# Patient Record
Sex: FEMALE | Race: WHITE | NOT HISPANIC OR LATINO | Employment: FULL TIME | URBAN - METROPOLITAN AREA
[De-identification: names, ages, dates, MRNs, and addresses within clinical notes are randomized per-mention and may not be internally consistent; named-entity substitution may affect disease eponyms.]

---

## 2020-01-30 ENCOUNTER — DOCUMENTATION (OUTPATIENT)
Dept: BARIATRICS/WEIGHT MGMT | Facility: CLINIC | Age: 50
End: 2020-01-30

## 2020-01-30 ENCOUNTER — OFFICE VISIT (OUTPATIENT)
Dept: BARIATRICS/WEIGHT MGMT | Facility: CLINIC | Age: 50
End: 2020-01-30

## 2020-01-30 VITALS
SYSTOLIC BLOOD PRESSURE: 118 MMHG | HEART RATE: 76 BPM | HEIGHT: 66 IN | OXYGEN SATURATION: 99 % | TEMPERATURE: 98.1 F | WEIGHT: 220.5 LBS | BODY MASS INDEX: 35.44 KG/M2 | RESPIRATION RATE: 18 BRPM | DIASTOLIC BLOOD PRESSURE: 66 MMHG

## 2020-01-30 DIAGNOSIS — G47.33 OBSTRUCTIVE SLEEP APNEA SYNDROME: ICD-10-CM

## 2020-01-30 DIAGNOSIS — I10 HYPERTENSION, UNSPECIFIED TYPE: ICD-10-CM

## 2020-01-30 DIAGNOSIS — E66.01 MORBID OBESITY (HCC): ICD-10-CM

## 2020-01-30 DIAGNOSIS — Z87.891 FORMER SMOKER: ICD-10-CM

## 2020-01-30 DIAGNOSIS — E66.9 DIABETES MELLITUS TYPE 2 IN OBESE (HCC): Primary | ICD-10-CM

## 2020-01-30 DIAGNOSIS — K21.9 GASTROESOPHAGEAL REFLUX DISEASE, ESOPHAGITIS PRESENCE NOT SPECIFIED: ICD-10-CM

## 2020-01-30 DIAGNOSIS — R10.13 DYSPEPSIA: ICD-10-CM

## 2020-01-30 DIAGNOSIS — E11.69 DIABETES MELLITUS TYPE 2 IN OBESE (HCC): Primary | ICD-10-CM

## 2020-01-30 PROCEDURE — 99205 OFFICE O/P NEW HI 60 MIN: CPT | Performed by: PHYSICIAN ASSISTANT

## 2020-01-30 RX ORDER — NEBIVOLOL 5 MG/1
5 TABLET ORAL DAILY
COMMUNITY
Start: 2020-01-28

## 2020-01-30 RX ORDER — BUSPIRONE HYDROCHLORIDE 15 MG/1
15 TABLET ORAL 2 TIMES DAILY
COMMUNITY
Start: 2020-01-16 | End: 2022-11-14

## 2020-01-30 RX ORDER — HYDROXYZINE PAMOATE 25 MG/1
25 CAPSULE ORAL 3 TIMES DAILY PRN
COMMUNITY
Start: 2019-12-29 | End: 2022-10-27

## 2020-01-30 RX ORDER — LOSARTAN POTASSIUM 50 MG/1
100 TABLET ORAL DAILY
COMMUNITY
Start: 2020-01-28 | End: 2022-12-30

## 2020-01-30 RX ORDER — CARIPRAZINE 4.5 MG/1
CAPSULE, GELATIN COATED ORAL DAILY
COMMUNITY
Start: 2020-01-16 | End: 2022-10-27

## 2020-01-30 RX ORDER — MELOXICAM 15 MG/1
15 TABLET ORAL DAILY
COMMUNITY
Start: 2020-01-25 | End: 2022-11-14

## 2020-01-30 RX ORDER — LISDEXAMFETAMINE DIMESYLATE 70 MG/1
70 CAPSULE ORAL EVERY MORNING
COMMUNITY
Start: 2019-12-31 | End: 2022-10-27

## 2020-01-30 RX ORDER — LIRAGLUTIDE 6 MG/ML
1.8 INJECTION SUBCUTANEOUS DAILY
COMMUNITY
Start: 2020-01-12 | End: 2022-10-27

## 2020-01-30 RX ORDER — OMEPRAZOLE 40 MG/1
40 CAPSULE, DELAYED RELEASE ORAL DAILY
COMMUNITY
Start: 2019-11-21 | End: 2022-11-14

## 2020-01-30 RX ORDER — HYDROCHLOROTHIAZIDE 25 MG/1
25 TABLET ORAL DAILY
COMMUNITY
Start: 2019-11-20 | End: 2022-12-30

## 2020-01-30 NOTE — PROGRESS NOTES
[facility] BARIATRIC SURGERY  [address]  [address]  [facility] [address]  [phone]      Patient  Name:  Yodit Sotelo  :  1970      Date of Visit: 2020      Chief Complaint:  weight gain; unable to maintain weight loss    History of Present Illness:  Yodit Sotelo is a 49 y.o. female who presents today for evaluation, education and consultation regarding weight loss surgery. The patient is interested in sleeve gastrectomy with Dr. Villa.     Yodit has been overweight for at least 5 years, has been 35 pounds or more overweight for at least 5 years, and started dieting at age 17.  Previous diet attempts include: Low Carbohydrate, Low Fat, Calorie Counting, Olivier's Diet and Slim Fast; Weight Watchers; Wellbutrin, Redux, Fen-Phen and Amphetamines.  The most weight Yodit lost was 60 pounds on WW (6 years ago) but was only able to maintain that weight loss for 2 years.  Her maximum lifetime weight is 220 pounds - current weight.    As above, patient has been overweight for many years, with numerous failed dietary/weight loss attempts.  She now has obesity related comorbidities and as such has decided to pursue weight loss surgery.  All past medical, surgical, social and family history have been obtained and discussed today, as pertinent to bariatric surgery.     Past Medical History:   Diagnosis Date   • ADD (attention deficit disorder)     on Vyvanse   • Anxiety and depression     follows w/ psych NP q6 wks   • CHF (congestive heart failure) (CMS/[facility])     d/t broken heart syndrome remotely, no longer follows w/ cardiology, EF reportedly normal   • DM2 (diabetes mellitus, type 2) (CMS/[facility])     dx 2019, never on insulin, A1C 6.5   • Dyspepsia    • Dyspnea on exertion    • Fatigue    • Former smoker     quit    • GERD (gastroesophageal reflux disease)     controlled w/ daily Prilosec, last EGD  revealing HH   • HTN (hypertension)    • Migraines     on Topamax for  prophylaxis w/ prn Sumatriptan   • Morbid obesity (CMS/HCC)    • Osteoarthritis     daily Mobic, denies steroids   • Sleep apnea     CPAP semi-compliant     Past Surgical History:   Procedure Laterality Date   • ABDOMINOPLASTY     •  SECTION     •  SECTION     •  SECTION     • COLONOSCOPY      benign polyps   • LAPAROSCOPIC HYSTERECTOMY      partial, benign dz   • TONSILLECTOMY         No Known Allergies    Current Outpatient Medications:   •  busPIRone (BUSPAR) 15 MG tablet, Take 15 mg by mouth 2 (Two) Times a Day., Disp: , Rfl:   •  BYSTOLIC 10 MG tablet, Take 10 mg by mouth Daily., Disp: , Rfl:   •  hydroCHLOROthiazide (HYDRODIURIL) 25 MG tablet, Take 25 mg by mouth Daily., Disp: , Rfl:   •  hydrOXYzine pamoate (VISTARIL) 25 MG capsule, Take 25 mg by mouth 3 (Three) Times a Day As Needed., Disp: , Rfl:   •  losartan (COZAAR) 50 MG tablet, Take 50 mg by mouth Daily., Disp: , Rfl:   •  meloxicam (MOBIC) 15 MG tablet, Take 15 mg by mouth Daily., Disp: , Rfl:   •  omeprazole (priLOSEC) 40 MG capsule, Take 40 mg by mouth Daily., Disp: , Rfl:   •  topiramate (TOPAMAX) 200 MG tablet, Take 200 mg by mouth Daily., Disp: , Rfl:   •  VICTOZA 18 MG/3ML solution pen-injector injection, Inject 1.8 mg under the skin into the appropriate area as directed Daily., Disp: , Rfl:   •  VRAYLAR 4.5 MG capsule, Daily., Disp: , Rfl:   •  VYVANSE 70 MG capsule, Take 70 mg by mouth Every Morning  , Disp: , Rfl:     Social History     Socioeconomic History   • Marital status:      Spouse name: Not on file   • Number of children: Not on file   • Years of education: Not on file   • Highest education level: Not on file   Tobacco Use   • Smoking status: Former Smoker     Packs/day: 1.00     Years: 15.00     Pack years: 15.00     Last attempt to quit:      Years since quittin.0   • Smokeless tobacco: Never Used   Substance and Sexual Activity   • Alcohol use: Not Currently   •  Drug use: Never   Social History Narrative    Living in [place], [place] w/ boyfriend.  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       Family History   Problem Relation Age of Onset   • Obesity Mother    • Diabetes Mother    • Hypertension Mother    • Sleep apnea Mother    • Obesity Father    • Skin cancer Father    • Diabetes Father    • Hypertension Father    • Stroke Father    • Obesity Sister    • Hypertension Sister    • Hypertension Brother    • Heart attack Paternal Grandfather        Review of Systems:  Constitutional:  reports fatigue, weight gain and denies fevers, chills.  HEENT:  denies headache, ear pain or loss of hearing, blurred or double vision, nasal discharge or sore throat.  Cardiovascular:  reports HTN, hx heart disease and denies hx MI, chest pain, palpitations, hx DVT.  Respiratory:  reports sleep apnea and denies cough , wheezing, asthma, hx PE.  Gastrointestinal:  reports heartburn and denies dysphagia, nausea, vomiting, abdominal pain, IBS, gallbladder issues, liver disease.  Genitourinary:  denies history of  frequent UTI, incontinence, hematuria, dysuria, polyuria, renal insufficiency.    Musculoskeletal:  reports joint pain, back pain and denies fibromyalgia and autoimmune disease.  Neurological:  reports migraines and denies numbness /tingling, dizziness, confusion, seizure.  Psychiatric:  reports anxiety, depression and denies bipolar disorder.  Endocrine:  reports diabetes and denies thyroid disease.  Hematologic:  denies anemia, bleeding disorder, hx blood transfusion.  Skin:  denies rashes, hx MRSA.    Physical Exam:  Vital Signs:  Weight: 100 kg (220 lb 8 oz)   Body mass index is 35.59 kg/m².  Temp: 98.1 °F (36.7 °C)   Heart Rate: 76   BP: 118/66     Physical Exam   Constitutional: She is oriented to person, place, and time. She appears well-developed and well-nourished.   HENT:   Head: Normocephalic and atraumatic.   Eyes: Conjunctivae are normal. No scleral icterus.   Neck: Neck supple.  No thyromegaly present.   Cardiovascular: Normal rate and regular rhythm.   No murmur heard.  Pulmonary/Chest: Effort normal and breath sounds normal. No respiratory distress. She has no wheezes. She has no rales.   Abdominal: Soft. Bowel sounds are normal. She exhibits no distension and no mass. There is no tenderness. No hernia.   scars: lap hysterectomy, abdominoplasty w/ felicity umbo, pfannenstiel   Musculoskeletal: Normal range of motion. She exhibits no edema.   Neurological: She is alert and oriented to person, place, and time. Gait normal.   Skin: Skin is warm and dry. No rash noted.   Psychiatric: She has a normal mood and affect. Judgment normal.   Vitals reviewed.      Patient Active Problem List   Diagnosis   • Morbid obesity (CMS/HCC)   • Fatigue   • Dyspepsia   • Dyspnea on exertion   • GERD (gastroesophageal reflux disease)   • DM2 (diabetes mellitus, type 2) (CMS/HCC)   • Anxiety and depression   • HTN (hypertension)   • CHF (congestive heart failure) (CMS/[facility])   • Osteoarthritis   • Migraines   • Former smoker   • Sleep apnea   • ADD (attention deficit disorder)       Assessment:    Yodit Sotelo is a 49 y.o. female with medically complicated obesity pursuing sleeve gastrectomy.    Weight loss surgery is deemed medically necessary given the following obesity related comorbidities including sleep apnea, diabetes, hypertension and GERD with current Weight: 100 kg (220 lb 8 oz) and Body mass index is 35.59 kg/m².    Plan:  Patient understands that bariatric surgery is not cosmetic surgery but rather a tool to help make a lifelong commitment to lifestyle changes including diet, exercise, behavior modifications, and healthy habits.  The patient has been educated today on those expected postoperative lifestyle changes.  The surgical procedure was discussed and all questions were answered.  Instructions on how to access [employer] (an internet based site w/ educational surgical videos) were given to the patient.   Recommended vitamin supplementation was reviewed.  The importance of avoiding ASA/ NSAIDS/ steroids/ tobacco/ hormones/ immunomodulators perioperatively was discussed in detail.  Psychological and Nutritional evaluations will be arranged prior to surgery.  The patient was advised to start on a high protein and low carbohydrate diet in preparation for surgery.      Preop testing will include: CBC, CMP, Lipids, TSH, HgA1C, H.Pylori stool, Pulmonary Function Testing, CXR and EKG, and EGD.    The risks and benefits of the upper endoscopy were discussed with the patient in detail and all questions were answered.  Possibility of perforation, bleeding, aspiration, and anesthesia reaction were reviewed.  Patient agrees to proceed.    Additional preop clearances required prior to surgery: Cardiac.      Patient is an acceptable candidate for surgery pending the above results and final consultation w/ Dr. Villa.    MARGE Mcguire        MDM: New problem w/ further workup planned.  Labs, imaging, additional testing planned, old records (op notes) obtained /reviewed, all to be discussed further w/ surgeon.  HIGH complexity.

## 2020-01-30 NOTE — PROGRESS NOTES
"Weight Loss Surgery  Presurgical Nutrition Assessment     Yodit Sotelo  2020  [id number]  [id number]  1970  female    Surgery desired: Sleeve Gastrectomy    Ht 167.6 cm (66\"); Wt 100 kg (220.5 #); BMI 35.6  Past Medical History:   Diagnosis Date   • ADD (attention deficit disorder)     on Vyvanse   • Anxiety and depression     follows w/ psych NP q6 wks   • CHF (congestive heart failure) (CMS/[facility])     d/t broken heart syndrome remotely, no longer follows w/ cardiology, EF reportedly normal   • DM2 (diabetes mellitus, type 2) (CMS/[facility])     dx , never on insulin, A1C 6.5   • Dyspepsia    • Dyspnea on exertion    • Fatigue    • Former smoker     quit    • GERD (gastroesophageal reflux disease)     controlled w/ daily Prilosec, last EGD  revealing HH   • HTN (hypertension)    • Migraines     on Topamax for prophylaxis w/ prn Sumatriptan   • Morbid obesity (CMS/[facility])    • Osteoarthritis     daily Mobic, denies steroids   • Sleep apnea     CPAP semi-compliant     Past Surgical History:   Procedure Laterality Date   • ABDOMINOPLASTY     •  SECTION     •  SECTION     •  SECTION     • COLONOSCOPY      benign polyps   • LAPAROSCOPIC HYSTERECTOMY      partial, benign dz   • TONSILLECTOMY       No Known Allergies    Current Outpatient Medications:   •  busPIRone (BUSPAR) 15 MG tablet, Take 15 mg by mouth 2 (Two) Times a Day., Disp: , Rfl:   •  BYSTOLIC 10 MG tablet, Take 10 mg by mouth Daily., Disp: , Rfl:   •  hydroCHLOROthiazide (HYDRODIURIL) 25 MG tablet, Take 25 mg by mouth Daily., Disp: , Rfl:   •  hydrOXYzine pamoate (VISTARIL) 25 MG capsule, Take 25 mg by mouth 3 (Three) Times a Day As Needed., Disp: , Rfl:   •  losartan (COZAAR) 50 MG tablet, Take 50 mg by mouth Daily., Disp: , Rfl:   •  meloxicam (MOBIC) 15 MG tablet, Take 15 mg by mouth Daily., Disp: , Rfl:   •  omeprazole (priLOSEC) 40 MG capsule, Take 40 mg by mouth Daily., Disp: , Rfl:   •  " topiramate (TOPAMAX) 200 MG tablet, Take 200 mg by mouth Daily., Disp: , Rfl:   •  VICTOZA 18 MG/3ML solution pen-injector injection, Inject 1.8 mg under the skin into the appropriate area as directed Daily., Disp: , Rfl:   •  VRAYLAR 4.5 MG capsule, Daily., Disp: , Rfl:   •  VYVANSE 70 MG capsule, Take 70 mg by mouth Every Morning  , Disp: , Rfl:       Nutrition Assessment    Estimated energy needs:  1645 kcal    Estimated calories for weight loss:  1200 kcal    IBW (Pounds):  155 #        Excess body weight (Pounds):  65 #       Nutrition Recall  24 Hour recall: (B) (L) (D) -  Reviewed and discussed with patient.  Pt is Dxd /c DMT2 & has been following a lower CHO diet, similar to WW, having participated in this in the past.  She has never had diabetic diet ed. Last a1c stated to be 6.5.    Brkfast = 1 egg, 1 oz cheese & 2 sl nevarez /c a biscuit.  Lunch = chicken melody (~3 oz) /c 4 potato wedges.  Dinner = Chicken stefanie /c 1 serving pasta & Occitan bread.  Diet low in protein - ingesting only ~ 1/2 as much as recommended for wt mgt.  Pt to focus on ingesting adeq protein in 3 meals + 2-3 high prot snks qd /c fewer breads & potatoes.     Exercise  never      Education    Provided information packet re:  Sleeve Gastrectomy  1. Reviewed guidelines for higher protein, limited carbohydrate diet to promote weight loss.  Encouraged patient to incorporate these principles of healthy eating from now until approximately 2 weeks prior to bariatric surgery date, when an even lower carbohydrate “liver-shrinking” regimen will be followed. (Information sheet re pre-op diet given).  Explained that after recovery from surgery this diet will again be followed to ensure further loss and for weight maintenance.    2. Encouraged patient to choose an acceptable protein supplement powder or shake for post-surgery liquid diet.  Provided product guidelines and examples.    3. Explained importance of goal setting to help in changing eating  behaviors that are not conducive to weight loss.  Targeted several on a worksheet which also included spaces for patient to work on issues specific to them.  4. Provided follow-up options for support, including contact information for dietitians here, if desired.  Web-based support information and apps for smart phones and computers given.  Noted that monthly support group is offered at this clinic, and that support is associated with successful weight loss.    Recommend that team proceed with surgery and follow per protocol.      Nutrition Goals   Dietary Guidelines per information packet as described above  Protein goal:  grams per day   Carbohydrate goal:  100-140 grams per day  Eliminate soda, sweet tea, etc.     Exercise Goals  Continue current exercise routine   Add 15-30 minutes of activity per day as tolerated      Christelle Abernathy RD  01/30/2020  3:36 PM

## 2020-02-24 ENCOUNTER — OFFICE VISIT (OUTPATIENT)
Dept: CARDIOLOGY | Facility: CLINIC | Age: 50
End: 2020-02-24

## 2020-02-24 VITALS
OXYGEN SATURATION: 98 % | DIASTOLIC BLOOD PRESSURE: 88 MMHG | BODY MASS INDEX: 35.62 KG/M2 | HEART RATE: 77 BPM | HEIGHT: 66 IN | SYSTOLIC BLOOD PRESSURE: 121 MMHG | WEIGHT: 221.6 LBS

## 2020-02-24 DIAGNOSIS — I10 ESSENTIAL HYPERTENSION: ICD-10-CM

## 2020-02-24 DIAGNOSIS — Z76.89 ENCOUNTER TO ESTABLISH CARE: ICD-10-CM

## 2020-02-24 DIAGNOSIS — R06.02 SHORTNESS OF BREATH: ICD-10-CM

## 2020-02-24 DIAGNOSIS — Z01.810 PRE-OPERATIVE CARDIOVASCULAR EXAMINATION: ICD-10-CM

## 2020-02-24 PROCEDURE — 99213 OFFICE O/P EST LOW 20 MIN: CPT | Performed by: PHYSICIAN ASSISTANT

## 2020-02-24 PROCEDURE — 93000 ELECTROCARDIOGRAM COMPLETE: CPT | Performed by: PHYSICIAN ASSISTANT

## 2020-02-24 NOTE — PATIENT INSTRUCTIONS

## 2020-02-24 NOTE — PROGRESS NOTES
Subjective   Yodit Sotelo is a 49 y.o. female     Chief Complaint   Patient presents with   • Establish Care     Pt here to establish cardiac care and cardiac clearance         HPI   The patient is a 49-year-old white female who presents today for establishment of cardiac care, but also for cardiac clearance prior to bariatric procedure.  The patient reports cardiac history of Takotsubo cardiomyopathy, occurring in roughly 2013 with an obvious precipitating event by patient report.  She had follow-up echocardiogram 2 months later and reported a normal systolic function.  Work-up for the cardiomyopathy suggested normal coronaries by her report.  She was lost to follow-up through cardiology and presents today to discuss consideration for clearance for surgical procedures.  Symptomatically, the patient continues to do fairly well from cardiovascular standpoint.  She has stable dyspnea and fatigue.  She has stable lower extremity edema.  She has no chest pain.  She denies neck, arm, or jaw discomfort.  The patient has had no evidence of PND, orthopnea, or other symptoms to potentially describe decompensation.  The patient has only rare palpitations, reporting no sustained dysrhythmic activity.  She has no dizziness or syncope at that time.  She has no further complaints otherwise and presents today for evaluation prior to surgery from cardiovascular standpoint as above.      Current Outpatient Medications   Medication Sig Dispense Refill   • busPIRone (BUSPAR) 15 MG tablet Take 15 mg by mouth 2 (Two) Times a Day.     • BYSTOLIC 10 MG tablet Take 10 mg by mouth Daily.     • hydroCHLOROthiazide (HYDRODIURIL) 25 MG tablet Take 25 mg by mouth Daily.     • hydrOXYzine pamoate (VISTARIL) 25 MG capsule Take 25 mg by mouth 3 (Three) Times a Day As Needed.     • losartan (COZAAR) 50 MG tablet Take 50 mg by mouth Daily.     • meloxicam (MOBIC) 15 MG tablet Take 15 mg by mouth Daily.     • Multiple Vitamin (MULTIVITAMINS PO) Take  1 tablet by mouth Daily.     • omeprazole (priLOSEC) 40 MG capsule Take 40 mg by mouth Daily.     • topiramate (TOPAMAX) 200 MG tablet Take 200 mg by mouth Daily.     • VICTOZA 18 MG/3ML solution pen-injector injection Inject 1.8 mg under the skin into the appropriate area as directed Daily.     • Vit B6-Vit [id number] 75-.012-1.2-500 MG patch Place 1 patch on the skin as directed by provider Daily.     • VRAYLAR 4.5 MG capsule Daily.     • VYVANSE 70 MG capsule Take 70 mg by mouth Every Morning         No current facility-administered medications for this visit.        Patient has no known allergies.    Past Medical History:   Diagnosis Date   • ADD (attention deficit disorder)     on Vyvanse   • Anxiety and depression     follows w/ psych NP q6 wks   • CHF (congestive heart failure) (CMS/[facility])     d/t broken heart syndrome remotely, no longer follows w/ cardiology, EF reportedly normal   • DM2 (diabetes mellitus, type 2) (CMS/[facility])     dx , never on insulin, A1C 6.5   • Dyspepsia    • Dyspnea on exertion    • Fatigue    • Former smoker     quit    • GERD (gastroesophageal reflux disease)     controlled w/ daily Prilosec, last EGD  revealing HH   • HTN (hypertension)    • Migraines     on Topamax for prophylaxis w/ prn Sumatriptan   • Morbid obesity (CMS/[facility])    • Osteoarthritis     daily Mobic, denies steroids   • Sleep apnea     CPAP semi-compliant       Social History     Socioeconomic History   • Marital status:      Spouse name: Not on file   • Number of children: Not on file   • Years of education: Not on file   • Highest education level: Not on file   Tobacco Use   • Smoking status: Former Smoker     Packs/day: 1.00     Years: 15.00     Pack years: 15.00     Last attempt to quit:      Years since quittin.1   • Smokeless tobacco: Never Used   Substance and Sexual Activity   • Alcohol use: Not Currently   • Drug use: Never   Social History Narrative    Living in [place], [place] w/  "zoraidareyna.  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         Family History   Problem Relation Age of Onset   • Obesity Mother    • Diabetes Mother    • Hypertension Mother    • Sleep apnea Mother    • Obesity Father    • Skin cancer Father    • Diabetes Father    • Hypertension Father    • Stroke Father    • Obesity Sister    • Hypertension Sister    • Hypertension Brother    • Heart attack Paternal Grandfather        Review of Systems   Constitutional: Negative.  Negative for fatigue.   HENT: Negative.  Negative for congestion, rhinorrhea and sore throat.    Eyes: Positive for visual disturbance (wears glasses daily).   Respiratory: Negative.  Negative for chest tightness, shortness of breath and wheezing.    Cardiovascular: Negative.  Negative for chest pain, palpitations and leg swelling.   Gastrointestinal: Negative.  Negative for abdominal pain, nausea and vomiting.   Endocrine: Negative.  Negative for cold intolerance and heat intolerance.   Genitourinary: Negative.  Negative for difficulty urinating, frequency and urgency.   Musculoskeletal: Negative for arthralgias, back pain and neck pain.   Skin: Negative.  Negative for rash and wound.   Allergic/Immunologic: Negative.  Negative for environmental allergies and food allergies.   Neurological: Positive for dizziness (dizziness at night when lays down). Negative for syncope and light-headedness.   Hematological: Negative.  Does not bruise/bleed easily.   Psychiatric/Behavioral: Negative for agitation, confusion and sleep disturbance (denies waking up smothering/SOA, wears CPAP). The patient is not nervous/anxious.        Objective     Vitals:    02/24/20 1509   BP: 121/88   BP Location: Left arm   Patient Position: Sitting   Pulse: 77   SpO2: 98%   Weight: 101 kg (221 lb 9.6 oz)   Height: 167.6 cm (66\")        /88 (BP Location: Left arm, Patient Position: Sitting)   Pulse 77   Ht 167.6 cm (66\")   Wt 101 kg (221 lb 9.6 oz)   SpO2 98%   BMI 35.77 kg/m²  "     Lab Results (most recent)     None          Physical Exam   Constitutional: She is oriented to person, place, and time. She appears well-developed and well-nourished. No distress.   HENT:   Head: Normocephalic and atraumatic.   Eyes: Pupils are equal, round, and reactive to light. Conjunctivae and EOM are normal.   Neck: Normal range of motion. Neck supple. No JVD present. No tracheal deviation present.   Cardiovascular: Normal rate, regular rhythm, normal heart sounds and intact distal pulses.   Pulmonary/Chest: Effort normal and breath sounds normal.   Abdominal: Soft. Bowel sounds are normal. She exhibits no distension and no mass. There is no tenderness. There is no rebound and no guarding.   Musculoskeletal: Normal range of motion. She exhibits no edema, tenderness or deformity.   Neurological: She is alert and oriented to person, place, and time.   Skin: Skin is warm and dry. No rash noted. No erythema. No pallor.   Psychiatric: She has a normal mood and affect. Her behavior is normal. Judgment and thought content normal.   Nursing note and vitals reviewed.      Procedure     ECG 12 Lead  Date/Time: 2/24/2020 3:24 PM  Performed by: Keyon Camargo PA  Authorized by: Keyon Camargo PA   Previous ECG: no previous ECG available  Comments: Sinus rhythm at 68, normal axis, no acute changes noted.                 Assessment/Plan      Diagnosis Plan   1. Shortness of breath  Adult Transthoracic Echo Complete W/ Cont if Necessary Per Protocol   2. Essential hypertension  Adult Transthoracic Echo Complete W/ Cont if Necessary Per Protocol   3. Pre-operative cardiovascular examination  Adult Transthoracic Echo Complete W/ Cont if Necessary Per Protocol   4. Encounter to establish care  ECG 12 Lead    Adult Transthoracic Echo Complete W/ Cont if Necessary Per Protocol       1.  I would like to schedule for an echocardiogram given history of Takotsubo cardiomyopathy.  We need to evaluate LV size, function,  valvular morphologies, and parameters otherwise.    2.  The patient reports history of basically normal coronaries and work-up of cardiomyopathy as above.  A follow-up echo just 2 months after presentation with the same apparently suggested normalized systolic function.  We will attempt to obtain all those records.    3.  At this time, I feel the patient is on appropriate medications and will continue that without change.  Blood pressures typically are normotensive on that therapy.  She will continue those, will monitor blood pressures closely at home, and will call to us for any issues.    4.  For any complications of future, the patient will call to us immediately.  We will see her in follow-up of echo and recommend her further on surgical status at that time.  I do not feel she needs ischemia assessment at this time as she has no symptoms of angina and has had a history of largely normal coronaries by previous work-up.  If clinical course should change, she will call to us immediately.          Patient's Body mass index is 35.77 kg/m². BMI is above normal parameters. Recommendations include: educational material and referral to primary care.           Electronically signed by:    Answers for HPI/ROS submitted by the patient on 2/17/2020   What is the primary reason for your visit?: Physical

## 2020-02-27 ENCOUNTER — LAB REQUISITION (OUTPATIENT)
Dept: LAB | Facility: HOSPITAL | Age: 50
End: 2020-02-27

## 2020-02-27 ENCOUNTER — OUTSIDE FACILITY SERVICE (OUTPATIENT)
Dept: BARIATRICS/WEIGHT MGMT | Facility: CLINIC | Age: 50
End: 2020-02-27

## 2020-02-27 DIAGNOSIS — K21.00 GASTRO-ESOPHAGEAL REFLUX DISEASE WITH ESOPHAGITIS: ICD-10-CM

## 2020-02-27 PROCEDURE — 43239 EGD BIOPSY SINGLE/MULTIPLE: CPT | Performed by: SURGERY

## 2020-02-27 PROCEDURE — 88305 TISSUE EXAM BY PATHOLOGIST: CPT | Performed by: SURGERY

## 2020-02-28 LAB
CYTO UR: NORMAL
LAB AP CASE REPORT: NORMAL
LAB AP CLINICAL INFORMATION: NORMAL
PATH REPORT.FINAL DX SPEC: NORMAL
PATH REPORT.GROSS SPEC: NORMAL

## 2020-03-19 ENCOUNTER — HOSPITAL ENCOUNTER (OUTPATIENT)
Dept: CARDIOLOGY | Facility: HOSPITAL | Age: 50
Discharge: HOME | End: 2020-03-19
Admitting: PHYSICIAN ASSISTANT

## 2020-03-19 VITALS — WEIGHT: 222.66 LBS | HEIGHT: 66 IN | BODY MASS INDEX: 35.79 KG/M2

## 2020-03-19 DIAGNOSIS — R06.02 SHORTNESS OF BREATH: ICD-10-CM

## 2020-03-19 DIAGNOSIS — Z76.89 ENCOUNTER TO ESTABLISH CARE: ICD-10-CM

## 2020-03-19 DIAGNOSIS — I10 ESSENTIAL HYPERTENSION: ICD-10-CM

## 2020-03-19 DIAGNOSIS — Z01.810 PRE-OPERATIVE CARDIOVASCULAR EXAMINATION: ICD-10-CM

## 2020-03-19 PROCEDURE — 93306 TTE W/DOPPLER COMPLETE: CPT

## 2020-03-19 PROCEDURE — 93306 TTE W/DOPPLER COMPLETE: CPT | Performed by: INTERNAL MEDICINE

## 2020-03-23 ENCOUNTER — APPOINTMENT (OUTPATIENT)
Dept: CARDIOLOGY | Facility: HOSPITAL | Age: 50
End: 2020-03-23

## 2020-03-25 LAB — H PYLORI AG STL QL IA: NEGATIVE

## 2020-03-29 LAB
BH CV ECHO MEAS - AO MAX PG (FULL): 0.67 MMHG
BH CV ECHO MEAS - AO MAX PG: 3 MMHG
BH CV ECHO MEAS - AO MEAN PG (FULL): 0 MMHG
BH CV ECHO MEAS - AO MEAN PG: 1 MMHG
BH CV ECHO MEAS - AO ROOT AREA (BSA CORRECTED): 1.3
BH CV ECHO MEAS - AO ROOT AREA: 6.2 CM^2
BH CV ECHO MEAS - AO ROOT DIAM: 2.8 CM
BH CV ECHO MEAS - AO V2 MAX: 86.1 CM/SEC
BH CV ECHO MEAS - AO V2 MEAN: 55.8 CM/SEC
BH CV ECHO MEAS - AO V2 VTI: 21.2 CM
BH CV ECHO MEAS - BSA(HAYCOCK): 2.2 M^2
BH CV ECHO MEAS - BSA: 2.1 M^2
BH CV ECHO MEAS - BZI_BMI: 35.7 KILOGRAMS/M^2
BH CV ECHO MEAS - BZI_METRIC_HEIGHT: 167.6 CM
BH CV ECHO MEAS - BZI_METRIC_WEIGHT: 100.2 KG
BH CV ECHO MEAS - EDV(CUBED): 44.7 ML
BH CV ECHO MEAS - EDV(TEICH): 52.6 ML
BH CV ECHO MEAS - EF(CUBED): 69.9 %
BH CV ECHO MEAS - EF(TEICH): 62.5 %
BH CV ECHO MEAS - ESV(CUBED): 13.5 ML
BH CV ECHO MEAS - ESV(TEICH): 19.7 ML
BH CV ECHO MEAS - FS: 33 %
BH CV ECHO MEAS - IVS/LVPW: 1.3
BH CV ECHO MEAS - IVSD: 1.2 CM
BH CV ECHO MEAS - LA DIMENSION: 4 CM
BH CV ECHO MEAS - LA/AO: 1.4
BH CV ECHO MEAS - LAT PEAK E' VEL: 8.8 CM/SEC
BH CV ECHO MEAS - LV IVRT: 0.13 SEC
BH CV ECHO MEAS - LV MASS(C)D: 116.5 GRAMS
BH CV ECHO MEAS - LV MASS(C)DI: 55.8 GRAMS/M^2
BH CV ECHO MEAS - LV MAX PG: 2.3 MMHG
BH CV ECHO MEAS - LV MEAN PG: 1 MMHG
BH CV ECHO MEAS - LV V1 MAX: 75.7 CM/SEC
BH CV ECHO MEAS - LV V1 MEAN: 47.9 CM/SEC
BH CV ECHO MEAS - LV V1 VTI: 19.1 CM
BH CV ECHO MEAS - LVIDD: 3.6 CM
BH CV ECHO MEAS - LVIDS: 2.4 CM
BH CV ECHO MEAS - LVPWD: 0.94 CM
BH CV ECHO MEAS - MED PEAK E' VEL: 9 CM/SEC
BH CV ECHO MEAS - MV A MAX VEL: 34.5 CM/SEC
BH CV ECHO MEAS - MV DEC SLOPE: 398 CM/SEC^2
BH CV ECHO MEAS - MV DEC TIME: 0.14 SEC
BH CV ECHO MEAS - MV E MAX VEL: 64.3 CM/SEC
BH CV ECHO MEAS - MV E/A: 1.9
BH CV ECHO MEAS - MV MAX PG: 2.5 MMHG
BH CV ECHO MEAS - MV MEAN PG: 1 MMHG
BH CV ECHO MEAS - MV P1/2T MAX VEL: 82.1 CM/SEC
BH CV ECHO MEAS - MV P1/2T: 60.4 MSEC
BH CV ECHO MEAS - MV V2 MAX: 78.9 CM/SEC
BH CV ECHO MEAS - MV V2 MEAN: 53 CM/SEC
BH CV ECHO MEAS - MV V2 VTI: 18.4 CM
BH CV ECHO MEAS - MVA P1/2T LCG: 2.7 CM^2
BH CV ECHO MEAS - MVA(P1/2T): 3.6 CM^2
BH CV ECHO MEAS - PA MAX PG (FULL): 1 MMHG
BH CV ECHO MEAS - PA MAX PG: 3 MMHG
BH CV ECHO MEAS - PA MEAN PG (FULL): 1 MMHG
BH CV ECHO MEAS - PA MEAN PG: 2 MMHG
BH CV ECHO MEAS - PA V2 MAX: 86.1 CM/SEC
BH CV ECHO MEAS - PA V2 MEAN: 59.5 CM/SEC
BH CV ECHO MEAS - PA V2 VTI: 19.9 CM
BH CV ECHO MEAS - RAP SYSTOLE: 10 MMHG
BH CV ECHO MEAS - RV MAX PG: 1.9 MMHG
BH CV ECHO MEAS - RV MEAN PG: 1 MMHG
BH CV ECHO MEAS - RV V1 MAX: 69.2 CM/SEC
BH CV ECHO MEAS - RV V1 MEAN: 40.7 CM/SEC
BH CV ECHO MEAS - RV V1 VTI: 12.3 CM
BH CV ECHO MEAS - RVDD: 3.1 CM
BH CV ECHO MEAS - RVSP: 19 MMHG
BH CV ECHO MEAS - SI(AO): 62.6 ML/M^2
BH CV ECHO MEAS - SI(CUBED): 15 ML/M^2
BH CV ECHO MEAS - SI(TEICH): 15.8 ML/M^2
BH CV ECHO MEAS - SV(AO): 130.5 ML
BH CV ECHO MEAS - SV(CUBED): 31.3 ML
BH CV ECHO MEAS - SV(TEICH): 32.9 ML
BH CV ECHO MEAS - TR MAX VEL: 146 CM/SEC
BH CV ECHO MEASUREMENTS AVERAGE E/E' RATIO: 7.22
MAXIMAL PREDICTED HEART RATE: 172 BPM
STRESS TARGET HR: 146 BPM

## 2022-09-26 ENCOUNTER — TELEPHONE (OUTPATIENT)
Dept: SURGERY | Facility: CLINIC | Age: 52
End: 2022-09-26

## 2022-09-26 NOTE — TELEPHONE ENCOUNTER
I ATTEMPTED TO REACH PATIENT TO SCHEDULE APPT, REFERRED BY DR. ADAMSON FOR REFLUX. I LEFT MESSAGE TO RETURN MY CALL.

## 2022-10-27 ENCOUNTER — OFFICE VISIT (OUTPATIENT)
Dept: SURGERY | Facility: CLINIC | Age: 52
End: 2022-10-27

## 2022-10-27 VITALS
WEIGHT: 227.8 LBS | HEIGHT: 66 IN | BODY MASS INDEX: 36.61 KG/M2 | SYSTOLIC BLOOD PRESSURE: 119 MMHG | DIASTOLIC BLOOD PRESSURE: 76 MMHG | HEART RATE: 80 BPM

## 2022-10-27 DIAGNOSIS — R10.13 DYSPEPSIA: Primary | ICD-10-CM

## 2022-10-27 PROCEDURE — 99214 OFFICE O/P EST MOD 30 MIN: CPT | Performed by: SURGERY

## 2022-10-27 RX ORDER — TIRZEPATIDE 2.5 MG/.5ML
INJECTION, SOLUTION SUBCUTANEOUS
COMMUNITY
Start: 2022-10-20 | End: 2022-12-30

## 2022-10-27 RX ORDER — ONDANSETRON 4 MG/1
TABLET, ORALLY DISINTEGRATING ORAL
COMMUNITY
Start: 2022-09-12 | End: 2022-12-30

## 2022-10-27 RX ORDER — SODIUM CHLORIDE 0.9 % (FLUSH) 0.9 %
10 SYRINGE (ML) INJECTION AS NEEDED
Status: CANCELLED | OUTPATIENT
Start: 2022-12-06

## 2022-10-27 RX ORDER — SODIUM CHLORIDE 0.9 % (FLUSH) 0.9 %
10 SYRINGE (ML) INJECTION EVERY 12 HOURS SCHEDULED
Status: CANCELLED | OUTPATIENT
Start: 2022-12-06

## 2022-10-27 RX ORDER — ROSUVASTATIN CALCIUM 5 MG/1
5 TABLET, COATED ORAL DAILY
COMMUNITY
Start: 2022-09-04 | End: 2022-11-14

## 2022-10-27 NOTE — PROGRESS NOTES
Date of Service: 10/27/2022    Subjective   Yodit Sotelo is a 52 y.o. female is being seen for consultation for reflux today at the request of Saad Alvarado MD    Chief complaint: Dry cough, throat clearing, voice changes    Yodit Sotelo is a 52 y.o. obese female who presents to my clinic with progressively worsening dry cough, voice changes, sore throat.  She has had what she thought was sinus drainage issues for several years.  She has seen ENT and trialed multiple antihistamines.  Reports globus sensation and throat.  She has been on omeprazole for several years for acid reflux.  She denies substernal chest burning or dysphagia.  She denies unintentional weight loss.        Past Medical History:   Diagnosis Date   • ADD (attention deficit disorder)     on Vyvanse   • Anxiety and depression     follows w/ psych NP q6 wks   • CHF (congestive heart failure) ([facility])     d/t broken heart syndrome remotely, no longer follows w/ cardiology, EF reportedly normal   • DM2 (diabetes mellitus, type 2) ([facility])     dx , never on insulin, A1C 6.5   • Dyspepsia    • Dyspnea on exertion    • Fatigue    • Former smoker     quit    • GERD (gastroesophageal reflux disease)     controlled w/ daily Prilosec, last EGD  revealing HH   • HTN (hypertension)    • Migraines     on Topamax for prophylaxis w/ prn Sumatriptan   • Morbid obesity ([facility])    • Osteoarthritis     daily Mobic, denies steroids   • Sleep apnea     CPAP semi-compliant       Past Surgical History:   Procedure Laterality Date   • ABDOMINOPLASTY     •  SECTION     •  SECTION     •  SECTION     • COLONOSCOPY      benign polyps   • LAPAROSCOPIC HYSTERECTOMY      partial, benign dz   • TONSILLECTOMY           Family History   Problem Relation Age of Onset   • Obesity Mother    • Diabetes Mother    • Hypertension Mother    • Sleep apnea Mother    • Obesity Father    • Skin cancer Father    • Diabetes Father    •  "Hypertension Father    • Stroke Father    • Obesity Sister    • Hypertension Sister    • Hypertension Brother    • Heart attack Paternal Grandfather          Social History     Socioeconomic History   • Marital status:    Tobacco Use   • Smoking status: Former     Packs/day: 1.00     Years: 15.00     Pack years: 15.00     Types: Cigarettes     Quit date:      Years since quittin.8   • Smokeless tobacco: Never   Vaping Use   • Vaping Use: Former   Substance and Sexual Activity   • Alcohol use: Not Currently   • Drug use: Never   • Sexual activity: Defer                Review of Systems      Constitutional: No fevers, chills or malaise. No unintentional weight loss   Eyes: Denies visual changes    Cardiovascular: Denies chest pain, palpitations   Respiratory: Denies cough or shortness of breath   Abdominal/Gastrointestinal: See HPI    Genitourinary: Denies dysuria or hematuria   Musculoskeletal: Denies any chronic joint aches, pains or deformities              Skin: No lesions or rashes   Psychiatric: No recent mood changes   Neurologic: No paresthesias or loss of function        Objective     Physical Exam:      10/27/22  1514   Weight: 103 kg (227 lb 12.8 oz)    Body mass index is 36.77 kg/m².  Constitution: /76   Pulse 80   Ht 167.6 cm (66\")   Wt 103 kg (227 lb 12.8 oz)   BMI 36.77 kg/m²  . No acute distress.  Obese  Head: Normocephalic, atraumatic.   Eyes: Aligned without strabismus. Conjunctivae noninjected   Ears, Nose, Mouth: , no lesions appreciated   CV: Rhythm  and rate regular   Respiratory: Symmetric chest expansion. No respiratory distress.   Gastrointestinal:  Soft, nontender, nondistended.  Prior incisions noted, no evidence of hernia.  Skin:  No cyanosis, clubbing or edema bilaterally    Lymphatics: No abnormal cervical or supraclavicular adenopathy  Neurologic: No gross deficits.  Alert and oriented x3  Psychiatric: Normal mood            Assessment     Yodit Sotelo is a 52 " y.o. obese female with possible medically refractory gastroesophageal reflux disease    We discussed the work-up to include an upper GI study as well as an EGD with Bravo pH probe.               Carlos Lemon MD  [facility]

## 2022-11-03 ENCOUNTER — APPOINTMENT (OUTPATIENT)
Dept: GENERAL RADIOLOGY | Facility: HOSPITAL | Age: 52
End: 2022-11-03

## 2022-11-07 ENCOUNTER — TELEPHONE (OUTPATIENT)
Dept: SURGERY | Facility: CLINIC | Age: 52
End: 2022-11-07

## 2022-11-07 NOTE — TELEPHONE ENCOUNTER
ATTEMPTED TO CONTACT PATIENT BY PHONE WITH THE FOLLOWING SCHEDULE, LEFT VOICE MAIL AND NOW FOLLOWING WITH A APPOINTMENT LETTER WITH PROCEDURE DETAILS AS FOLLOW:    UGI: TUES NOV 8, 2022 @ 9 AM ARRIVAL FOR 9:15 UGI AT [facility] ON [address]. NOTHING TO EAT OR DRINK 6 HOURS PRIOR TO THIS SCAN.    EGD / BRAVO STUDY:  TUES NOV 15, 2022 ARRIVE AT [facility] AT 11 AM.  NOTHING TO EAT OR DRINK AFTER MIDNIGHT. PLEASE HAVE AN ADULT  TO DRIVE YOU HOME AFTER THIS PROCEDURE.

## 2022-11-08 ENCOUNTER — HOSPITAL ENCOUNTER (OUTPATIENT)
Dept: GENERAL RADIOLOGY | Facility: HOSPITAL | Age: 52
Discharge: HOME | End: 2022-11-08
Admitting: SURGERY

## 2022-11-08 DIAGNOSIS — R10.13 DYSPEPSIA: ICD-10-CM

## 2022-11-08 PROCEDURE — 74246 X-RAY XM UPR GI TRC 2CNTRST: CPT

## 2022-11-08 PROCEDURE — 74246 X-RAY XM UPR GI TRC 2CNTRST: CPT | Performed by: RADIOLOGY

## 2022-11-10 ENCOUNTER — TELEPHONE (OUTPATIENT)
Dept: SURGERY | Facility: CLINIC | Age: 52
End: 2022-11-10

## 2022-11-30 ENCOUNTER — TELEPHONE (OUTPATIENT)
Dept: SURGERY | Facility: CLINIC | Age: 52
End: 2022-11-30

## 2022-11-30 NOTE — TELEPHONE ENCOUNTER
ATTEMPTED TO CONTACT PATIENT TO REMIND HER OF HER UPCOMING EGD / BRAVO SCOPE ON 12/06/22.  REMINDER TO PATIENT THAT SHE WILL NEED TO STOP ALL STOMACH AND HEARTBURN MEDICATIONS AT THIS TIME.  LEFT THESE INSTRUCTIONS ON VOICE MAIL ALONG WITH CLINIC PHONE NUMBER IF SHE NEEDS TO CONTACT US FOR ANY REASON.  [phone].

## 2022-12-06 ENCOUNTER — ANESTHESIA (OUTPATIENT)
Dept: PERIOP | Facility: HOSPITAL | Age: 52
End: 2022-12-06

## 2022-12-06 ENCOUNTER — ANESTHESIA EVENT (OUTPATIENT)
Dept: PERIOP | Facility: HOSPITAL | Age: 52
End: 2022-12-06

## 2022-12-06 ENCOUNTER — HOSPITAL ENCOUNTER (OUTPATIENT)
Facility: HOSPITAL | Age: 52
Setting detail: HOSPITAL OUTPATIENT SURGERY
Discharge: HOME | End: 2022-12-06
Attending: SURGERY | Admitting: SURGERY

## 2022-12-06 VITALS
RESPIRATION RATE: 18 BRPM | TEMPERATURE: 97.3 F | HEIGHT: 66 IN | SYSTOLIC BLOOD PRESSURE: 120 MMHG | HEART RATE: 70 BPM | WEIGHT: 215 LBS | DIASTOLIC BLOOD PRESSURE: 85 MMHG | BODY MASS INDEX: 34.55 KG/M2 | OXYGEN SATURATION: 98 %

## 2022-12-06 DIAGNOSIS — R10.13 DYSPEPSIA: ICD-10-CM

## 2022-12-06 LAB — GLUCOSE BLDC GLUCOMTR-MCNC: 98 MG/DL (ref 70–130)

## 2022-12-06 PROCEDURE — 43239 EGD BIOPSY SINGLE/MULTIPLE: CPT | Performed by: SURGERY

## 2022-12-06 PROCEDURE — S0260 H&P FOR SURGERY: HCPCS | Performed by: SURGERY

## 2022-12-06 PROCEDURE — 88305 TISSUE EXAM BY PATHOLOGIST: CPT

## 2022-12-06 PROCEDURE — 82962 GLUCOSE BLOOD TEST: CPT

## 2022-12-06 PROCEDURE — C1889 IMPLANT/INSERT DEVICE, NOC: HCPCS | Performed by: SURGERY

## 2022-12-06 PROCEDURE — 25010000002 PROPOFOL 10 MG/ML EMULSION: Performed by: NURSE ANESTHETIST, CERTIFIED REGISTERED

## 2022-12-06 RX ORDER — MEPERIDINE HYDROCHLORIDE 25 MG/ML
12.5 INJECTION INTRAMUSCULAR; INTRAVENOUS; SUBCUTANEOUS
Status: DISCONTINUED | OUTPATIENT
Start: 2022-12-06 | End: 2022-12-06 | Stop reason: HOSPADM

## 2022-12-06 RX ORDER — SODIUM CHLORIDE 0.9 % (FLUSH) 0.9 %
10 SYRINGE (ML) INJECTION AS NEEDED
Status: DISCONTINUED | OUTPATIENT
Start: 2022-12-06 | End: 2022-12-06 | Stop reason: HOSPADM

## 2022-12-06 RX ORDER — SODIUM CHLORIDE 0.9 % (FLUSH) 0.9 %
10 SYRINGE (ML) INJECTION EVERY 12 HOURS SCHEDULED
Status: DISCONTINUED | OUTPATIENT
Start: 2022-12-06 | End: 2022-12-06 | Stop reason: HOSPADM

## 2022-12-06 RX ORDER — MIDAZOLAM HYDROCHLORIDE 1 MG/ML
1 INJECTION INTRAMUSCULAR; INTRAVENOUS
Status: DISCONTINUED | OUTPATIENT
Start: 2022-12-06 | End: 2022-12-06 | Stop reason: HOSPADM

## 2022-12-06 RX ORDER — SODIUM CHLORIDE, SODIUM LACTATE, POTASSIUM CHLORIDE, CALCIUM CHLORIDE 600; 310; 30; 20 MG/100ML; MG/100ML; MG/100ML; MG/100ML
125 INJECTION, SOLUTION INTRAVENOUS ONCE
Status: COMPLETED | OUTPATIENT
Start: 2022-12-06 | End: 2022-12-06

## 2022-12-06 RX ORDER — FENTANYL CITRATE 50 UG/ML
50 INJECTION, SOLUTION INTRAMUSCULAR; INTRAVENOUS
Status: DISCONTINUED | OUTPATIENT
Start: 2022-12-06 | End: 2022-12-06 | Stop reason: HOSPADM

## 2022-12-06 RX ORDER — PROPOFOL 10 MG/ML
VIAL (ML) INTRAVENOUS AS NEEDED
Status: DISCONTINUED | OUTPATIENT
Start: 2022-12-06 | End: 2022-12-06 | Stop reason: SURG

## 2022-12-06 RX ORDER — ONDANSETRON 2 MG/ML
4 INJECTION INTRAMUSCULAR; INTRAVENOUS AS NEEDED
Status: DISCONTINUED | OUTPATIENT
Start: 2022-12-06 | End: 2022-12-06 | Stop reason: HOSPADM

## 2022-12-06 RX ORDER — SODIUM CHLORIDE 9 MG/ML
40 INJECTION, SOLUTION INTRAVENOUS AS NEEDED
Status: DISCONTINUED | OUTPATIENT
Start: 2022-12-06 | End: 2022-12-06 | Stop reason: HOSPADM

## 2022-12-06 RX ORDER — OXYCODONE HYDROCHLORIDE AND ACETAMINOPHEN 5; 325 MG/1; MG/1
1 TABLET ORAL ONCE AS NEEDED
Status: DISCONTINUED | OUTPATIENT
Start: 2022-12-06 | End: 2022-12-06 | Stop reason: HOSPADM

## 2022-12-06 RX ORDER — SODIUM CHLORIDE, SODIUM LACTATE, POTASSIUM CHLORIDE, CALCIUM CHLORIDE 600; 310; 30; 20 MG/100ML; MG/100ML; MG/100ML; MG/100ML
100 INJECTION, SOLUTION INTRAVENOUS ONCE AS NEEDED
Status: DISCONTINUED | OUTPATIENT
Start: 2022-12-06 | End: 2022-12-06 | Stop reason: HOSPADM

## 2022-12-06 RX ORDER — IPRATROPIUM BROMIDE AND ALBUTEROL SULFATE 2.5; .5 MG/3ML; MG/3ML
3 SOLUTION RESPIRATORY (INHALATION) ONCE AS NEEDED
Status: DISCONTINUED | OUTPATIENT
Start: 2022-12-06 | End: 2022-12-06 | Stop reason: HOSPADM

## 2022-12-06 RX ADMIN — SODIUM CHLORIDE, POTASSIUM CHLORIDE, SODIUM LACTATE AND CALCIUM CHLORIDE: 600; 310; 30; 20 INJECTION, SOLUTION INTRAVENOUS at 09:33

## 2022-12-06 RX ADMIN — PROPOFOL 200 MCG/KG/MIN: 10 INJECTION, EMULSION INTRAVENOUS at 09:50

## 2022-12-06 RX ADMIN — PROPOFOL 30 MG: 10 INJECTION, EMULSION INTRAVENOUS at 09:51

## 2022-12-06 RX ADMIN — PROPOFOL 60 MG: 10 INJECTION, EMULSION INTRAVENOUS at 09:50

## 2022-12-06 NOTE — ANESTHESIA POSTPROCEDURE EVALUATION
Patient: Yodit Sotelo    Procedure Summary     Date: 12/06/22 Room / Location: [facility] OR [address] [place] [address] COR OR    Anesthesia Start: 0948 Anesthesia Stop: 1000    Procedure: ESOPHAGOGASTRODUODENOSCOPY AND BRAVO (Esophagus) Diagnosis:       Dyspepsia      (Dyspepsia [R10.13])    Surgeons: Carlos Lemon MD Provider: Chaz Nagel DO    Anesthesia Type: general ASA Status: 2          Anesthesia Type: general    Vitals  Vitals Value Taken Time   /85 12/06/22 1030   Temp 97.3 °F (36.3 °C) 12/06/22 1000   Pulse 70 12/06/22 1030   Resp 18 12/06/22 1030   SpO2 98 % 12/06/22 1030           Post Anesthesia Care and Evaluation    Patient location during evaluation: PHASE II  Patient participation: complete - patient participated  Level of consciousness: awake and alert  Pain score: 0  Pain management: adequate    Airway patency: patent  Anesthetic complications: No anesthetic complications    Cardiovascular status: acceptable  Respiratory status: acceptable  Hydration status: acceptable

## 2022-12-06 NOTE — ANESTHESIA PREPROCEDURE EVALUATION
Anesthesia Evaluation                  Airway   Mallampati: I  TM distance: >3 FB  Neck ROM: full  No difficulty expected  Dental - normal exam     Pulmonary - normal exam   (+) a smoker Former, shortness of breath, sleep apnea,   Cardiovascular - normal exam    (+) hypertension, CHF , hyperlipidemia,       Neuro/Psych  (+) headaches, psychiatric history,    GI/Hepatic/Renal/Endo    (+) morbid obesity, GERD,  diabetes mellitus,     Musculoskeletal     Abdominal  - normal exam    Bowel sounds: normal.   Substance History      OB/GYN          Other   arthritis,                      Anesthesia Plan    ASA 2     general     intravenous induction     Anesthetic plan, risks, benefits, and alternatives have been provided, discussed and informed consent has been obtained with: patient.        CODE STATUS:

## 2022-12-06 NOTE — H&P
Chief complaint: Dry cough, throat clearing, voice changes     Yodit Sotelo is a 52 y.o. obese female who presents to my clinic with progressively worsening dry cough, voice changes, sore throat.  She has had what she thought was sinus drainage issues for several years.  She has seen ENT and trialed multiple antihistamines.  Reports globus sensation and throat.  She has been on omeprazole for several years for acid reflux.  She denies substernal chest burning or dysphagia.  She denies unintentional weight loss.    Since the last I saw the patient, she has had no interval changes to her medical history. She continues to have dry cough, voice changes and sore throat. She has held her PPI for EGD bravo and has noted a significant worsening of her GERD symptoms.           Medical History        Past Medical History:   Diagnosis Date   • ADD (attention deficit disorder)       on Vyvanse   • Anxiety and depression       follows w/ psych NP q6 wks   • CHF (congestive heart failure) ([facility])       d/t broken heart syndrome remotely, no longer follows w/ cardiology, EF reportedly normal   • DM2 (diabetes mellitus, type 2) ([facility])       dx , never on insulin, A1C 6.5   • Dyspepsia     • Dyspnea on exertion     • Fatigue     • Former smoker       quit    • GERD (gastroesophageal reflux disease)       controlled w/ daily Prilosec, last EGD  revealing HH   • HTN (hypertension)     • Migraines       on Topamax for prophylaxis w/ prn Sumatriptan   • Morbid obesity ([facility])     • Osteoarthritis       daily Mobic, denies steroids   • Sleep apnea       CPAP semi-compliant            Surgical History   Past Surgical History:   Procedure Laterality Date   • ABDOMINOPLASTY      •  SECTION      •  SECTION      •  SECTION      • COLONOSCOPY   2012     benign polyps   • LAPAROSCOPIC HYSTERECTOMY   2012     partial, benign dz   • TONSILLECTOMY                  Family History   Problem  Relation Age of Onset   • Obesity Mother     • Diabetes Mother     • Hypertension Mother     • Sleep apnea Mother     • Obesity Father     • Skin cancer Father     • Diabetes Father     • Hypertension Father     • Stroke Father     • Obesity Sister     • Hypertension Sister     • Hypertension Brother     • Heart attack Paternal Grandfather              Social History   Social History            Socioeconomic History   • Marital status:    Tobacco Use   • Smoking status: Former       Packs/day: 1.00       Years: 15.00       Pack years: 15.00       Types: Cigarettes       Quit date:        Years since quittin.8   • Smokeless tobacco: Never   Vaping Use   • Vaping Use: Former   Substance and Sexual Activity   • Alcohol use: Not Currently   • Drug use: Never   • Sexual activity: Defer                        Review of Systems      14 pt ROS is negative except for what is noted in HPI                 Objective         Physical Exam:          Constitution: No acute distress.  Obese  Head: Normocephalic, atraumatic.   Eyes: Aligned without strabismus. Conjunctivae noninjected   Ears, Nose, Mouth: , no lesions appreciated   Neurologic: No gross deficits.  Alert and oriented x3  Psychiatric: Normal mood               UGI with small hiatal hernia with mild to moderate GERD        Assessment         Yodit Sotelo is a 52 y.o. obese female with possible medically refractory gastroesophageal reflux disease and small hiatal hernia     To endoscopy for EGD bravo

## 2022-12-08 LAB — REF LAB TEST METHOD: NORMAL

## 2022-12-13 ENCOUNTER — DOCUMENTATION (OUTPATIENT)
Dept: SURGERY | Facility: CLINIC | Age: 52
End: 2022-12-13

## 2022-12-13 NOTE — PROGRESS NOTES
BRAVO BOX WAS RETURNED TO [facility] THIS DATE 12/13/22.  BRAVO BOX DELIVERED TO [facility] DEPARTMENT BY MARY JO GALEANO 12/13/22.

## 2022-12-15 ENCOUNTER — OFFICE VISIT (OUTPATIENT)
Dept: SURGERY | Facility: CLINIC | Age: 52
End: 2022-12-15

## 2022-12-15 VITALS
BODY MASS INDEX: 35.48 KG/M2 | WEIGHT: 220.8 LBS | DIASTOLIC BLOOD PRESSURE: 78 MMHG | HEIGHT: 66 IN | SYSTOLIC BLOOD PRESSURE: 118 MMHG

## 2022-12-15 DIAGNOSIS — K44.9 HIATAL HERNIA: Primary | ICD-10-CM

## 2022-12-15 DIAGNOSIS — K21.9 GASTROESOPHAGEAL REFLUX DISEASE WITHOUT ESOPHAGITIS: ICD-10-CM

## 2022-12-15 PROCEDURE — 99214 OFFICE O/P EST MOD 30 MIN: CPT | Performed by: SURGERY

## 2022-12-15 RX ORDER — SODIUM CHLORIDE 0.9 % (FLUSH) 0.9 %
3 SYRINGE (ML) INJECTION EVERY 12 HOURS SCHEDULED
Status: CANCELLED | OUTPATIENT
Start: 2023-01-02

## 2022-12-15 RX ORDER — SCOLOPAMINE TRANSDERMAL SYSTEM 1 MG/1
1 PATCH, EXTENDED RELEASE TRANSDERMAL CONTINUOUS
Status: CANCELLED | OUTPATIENT
Start: 2023-01-02 | End: 2023-01-05

## 2022-12-15 RX ORDER — SODIUM CHLORIDE 0.9 % (FLUSH) 0.9 %
10 SYRINGE (ML) INJECTION AS NEEDED
Status: CANCELLED | OUTPATIENT
Start: 2023-01-02

## 2022-12-15 RX ORDER — SODIUM CHLORIDE 9 MG/ML
40 INJECTION, SOLUTION INTRAVENOUS AS NEEDED
Status: CANCELLED | OUTPATIENT
Start: 2023-01-02

## 2022-12-15 NOTE — H&P (VIEW-ONLY)
Patient Name:  Yodit Sotelo  YOB: 1970  [id number]           General Surgery History and Physical    Date of Service: 12/15/22    Chief Complaint-dry cough, GERD, throat clearing    Subjective     Yodit Sotelo is a 52 y.o. obese female who presents to my clinic with progressively worsening dry cough, voice changes, sore throat.  She has had what she thought was sinus drainage issues for several years.  She has seen ENT and trialed multiple antihistamines.  Reports globus sensation and throat.  She has been on omeprazole for several years for acid reflux.  She denies substernal chest burning or dysphagia.  She denies unintentional weight loss.  At the time that she held her PPI for her EGD Bravo, she states her symptoms were worse.  She is very distraught about her symptoms of voice changes, dry cough, sore throat, throat clearing.    Is now following up EGD with Bravo, as well as upper GI.  EGD demonstrated a small sliding hiatal hernia.  DeMeester score was 276.  Upper GI demonstrated a small hiatal hernia with mild to moderate gastroesophageal reflux disease    Allergy: No Known Allergies    Medications:    No current facility-administered medications for this visit.    Current Outpatient Medications on File Prior to Visit   Medication Sig   • BYSTOLIC 10 MG tablet Take 10 mg by mouth Daily.   • hydroCHLOROthiazide (HYDRODIURIL) 25 MG tablet Take 25 mg by mouth Daily.   • losartan (COZAAR) 50 MG tablet Take 50 mg by mouth Daily.   • Mounjaro 2.5 MG/0.5ML solution pen-injector START INJECT 2.5 MG SUBCUTANEOUS ONCE WEEKLY 30 DAYS   • ondansetron ODT (ZOFRAN-ODT) 4 MG disintegrating tablet TAKE 1 TABLET BY MOUTH EVERY 8 HOURS AS NEEDED NAUSEA     No current facility-administered medications on file prior to visit.       PMHx:   Past Medical History:   Diagnosis Date   • ADD (attention deficit disorder)     on Vyvanse   • Anxiety and depression     follows w/ psych NP q6 wks   • CHF (congestive heart  failure) ([facility])     d/t broken heart syndrome remotely, no longer follows w/ cardiology, EF reportedly normal   • Colon polyp    • Diverticulitis of colon    • DM2 (diabetes mellitus, type 2) ([facility])     dx 2019, never on insulin, A1C 6.5   • Dyspepsia    • Dyspnea on exertion    • Fatigue    • Former smoker     quit    • GERD (gastroesophageal reflux disease)     controlled w/ daily Prilosec, last EGD  revealing HH   • HTN (hypertension)    • Hyperlipidemia    • Migraines     on Topamax for prophylaxis w/ prn Sumatriptan   • Morbid obesity (HCC)    • Osteoarthritis     daily Mobic, denies steroids   • Sleep apnea     CPAP semi-compliant         Past Surgical History:  Past Surgical History:   Procedure Laterality Date   • ABDOMINOPLASTY     • BRAVO PROCEDURE N/A 2022    Procedure: ESOPHAGOGASTRODUODENOSCOPY AND LY;  Surgeon: Carlos Lemon MD;  Location: [facility];  Service: Gastroenterology;  Laterality: N/A;  bravo place at 31cm per Dr. Lemon   •  SECTION     •  SECTION     •  SECTION     • COLONOSCOPY      benign polyps   • LAPAROSCOPIC HYSTERECTOMY  2012    partial, benign dz   • TONSILLECTOMY           Family History: Denies    Social History: Former smoker, quit in      Review of Systems   14 point review of systems negative other than what is noted in HPI          Objective     Physical Exam:      Vital Signs  /78   Ht 167.6 cm (66\")   Wt 100 kg (220 lb 12.8 oz)   BMI 35.64 kg/m²     Body mass index is 35.64 kg/m².    Physical Exam:      Constitution: /78   Ht 167.6 cm (66\")   Wt 100 kg (220 lb 12.8 oz)   BMI 35.64 kg/m²  . No acute distress.   Head: Normocephalic, atraumatic.   Eyes: Aligned without strabismus. Conjunctivae noninjected   Ears, Nose, Mouth: . No lesions appreciated   CV: Rhythm  and rate regular   Respiratory: Symmetric chest expansion. No respiratory distress.   Gastrointestinal:  soft,  nondistended, nontender  Skin:  No cyanosis, clubbing or edema bilaterally   Lymphatics: No abnormal cervical or supraclavicular lymphadenopathy appreciated   Neurologic: No gross deficits. Alert and oriented x3   Psychiatric: normal mood       Results Review: I have personally reviewed all of the recent lab and imaging results available at this time.    Upper GI with small hiatal hernia with moderate gastroesophageal reflux disease       Assessment & Plan     Assessment and Plan:  52 y.o. obese female with a hiatal hernia and medically refractory gastroesophageal reflux disease    Risks and benefits of robotic assisted laparoscopic hiatal hernia repair, toupee fundoplication, possible mesh, EGD were discussed.  This should control her acid reflux but it may take some time for her upper respiratory symptoms to improve since this is likely secondary to damage to the vocal cords.            Carlos Lemon MD  [facility]  12/15/22  14:20 EST         Rhombic Flap Text: The defect edges were debeveled with a #15 scalpel blade.  Given the location of the defect and the proximity to free margins a rhombic flap was deemed most appropriate.  Using a sterile surgical marker, an appropriate rhombic flap was drawn incorporating the defect.    The area thus outlined was incised deep to adipose tissue with a #15 scalpel blade.  The skin margins were undermined to an appropriate distance in all directions utilizing iris scissors.

## 2022-12-15 NOTE — PROGRESS NOTES
Patient Name:  Yodit Sotelo  YOB: 1970  [id number]           General Surgery History and Physical    Date of Service: 12/15/22    Chief Complaint-dry cough, GERD, throat clearing    Subjective     Yodit Sotelo is a 52 y.o. obese female who presents to my clinic with progressively worsening dry cough, voice changes, sore throat.  She has had what she thought was sinus drainage issues for several years.  She has seen ENT and trialed multiple antihistamines.  Reports globus sensation and throat.  She has been on omeprazole for several years for acid reflux.  She denies substernal chest burning or dysphagia.  She denies unintentional weight loss.  At the time that she held her PPI for her EGD Bravo, she states her symptoms were worse.  She is very distraught about her symptoms of voice changes, dry cough, sore throat, throat clearing.    Is now following up EGD with Bravo, as well as upper GI.  EGD demonstrated a small sliding hiatal hernia.  DeMeester score was 276.  Upper GI demonstrated a small hiatal hernia with mild to moderate gastroesophageal reflux disease    Allergy: No Known Allergies    Medications:    No current facility-administered medications for this visit.    Current Outpatient Medications on File Prior to Visit   Medication Sig   • BYSTOLIC 10 MG tablet Take 10 mg by mouth Daily.   • hydroCHLOROthiazide (HYDRODIURIL) 25 MG tablet Take 25 mg by mouth Daily.   • losartan (COZAAR) 50 MG tablet Take 50 mg by mouth Daily.   • Mounjaro 2.5 MG/0.5ML solution pen-injector START INJECT 2.5 MG SUBCUTANEOUS ONCE WEEKLY 30 DAYS   • ondansetron ODT (ZOFRAN-ODT) 4 MG disintegrating tablet TAKE 1 TABLET BY MOUTH EVERY 8 HOURS AS NEEDED NAUSEA     No current facility-administered medications on file prior to visit.       PMHx:   Past Medical History:   Diagnosis Date   • ADD (attention deficit disorder)     on Vyvanse   • Anxiety and depression     follows w/ psych NP q6 wks   • CHF (congestive heart  "failure) ([facility])     d/t broken heart syndrome remotely, no longer follows w/ cardiology, EF reportedly normal   • Colon polyp    • Diverticulitis of colon    • DM2 (diabetes mellitus, type 2) ([facility])     dx 2019, never on insulin, A1C 6.5   • Dyspepsia    • Dyspnea on exertion    • Fatigue    • Former smoker     quit    • GERD (gastroesophageal reflux disease)     controlled w/ daily Prilosec, last EGD  revealing HH   • HTN (hypertension)    • Hyperlipidemia    • Migraines     on Topamax for prophylaxis w/ prn Sumatriptan   • Morbid obesity (HCC)    • Osteoarthritis     daily Mobic, denies steroids   • Sleep apnea     CPAP semi-compliant         Past Surgical History:  Past Surgical History:   Procedure Laterality Date   • ABDOMINOPLASTY     • BRAVO PROCEDURE N/A 2022    Procedure: ESOPHAGOGASTRODUODENOSCOPY AND LY;  Surgeon: Carlos Lemon MD;  Location: [facility];  Service: Gastroenterology;  Laterality: N/A;  bravo place at 31cm per Dr. Lemon   •  SECTION     •  SECTION     •  SECTION     • COLONOSCOPY      benign polyps   • LAPAROSCOPIC HYSTERECTOMY  2012    partial, benign dz   • TONSILLECTOMY           Family History: Denies    Social History: Former smoker, quit in      Review of Systems   14 point review of systems negative other than what is noted in HPI          Objective     Physical Exam:      Vital Signs  /78   Ht 167.6 cm (66\")   Wt 100 kg (220 lb 12.8 oz)   BMI 35.64 kg/m²     Body mass index is 35.64 kg/m².    Physical Exam:      Constitution: /78   Ht 167.6 cm (66\")   Wt 100 kg (220 lb 12.8 oz)   BMI 35.64 kg/m²  . No acute distress.   Head: Normocephalic, atraumatic.   Eyes: Aligned without strabismus. Conjunctivae noninjected   Ears, Nose, Mouth: . No lesions appreciated   CV: Rhythm  and rate regular   Respiratory: Symmetric chest expansion. No respiratory distress.   Gastrointestinal:  soft, " nondistended, nontender  Skin:  No cyanosis, clubbing or edema bilaterally   Lymphatics: No abnormal cervical or supraclavicular lymphadenopathy appreciated   Neurologic: No gross deficits. Alert and oriented x3   Psychiatric: normal mood       Results Review: I have personally reviewed all of the recent lab and imaging results available at this time.    Upper GI with small hiatal hernia with moderate gastroesophageal reflux disease       Assessment & Plan     Assessment and Plan:  52 y.o. obese female with a hiatal hernia and medically refractory gastroesophageal reflux disease    Risks and benefits of robotic assisted laparoscopic hiatal hernia repair, toupee fundoplication, possible mesh, EGD were discussed.  This should control her acid reflux but it may take some time for her upper respiratory symptoms to improve since this is likely secondary to damage to the vocal cords.            Carlos Lemon MD  [facility]  12/15/22  14:20 EST

## 2022-12-16 ENCOUNTER — TELEPHONE (OUTPATIENT)
Dept: SURGERY | Facility: CLINIC | Age: 52
End: 2022-12-16

## 2022-12-16 DIAGNOSIS — Z01.818 PREOP TESTING: Primary | ICD-10-CM

## 2022-12-16 PROBLEM — K44.9 HIATAL HERNIA: Status: ACTIVE | Noted: 2022-12-16

## 2022-12-16 NOTE — TELEPHONE ENCOUNTER
SPOKE TO TANYA TODAY REGARDING SCHEDULING OF HER HIATAL HERNIA SURGERY.  SCHEDULE WILL BE AS FOLLOWS:    PRE OP TESTING (PAT)  FRI: 1/06/23 @ 8:30 AM AT OUTPATIENT SURGERY CENTER AT [facility].    NOTHING TO EAT OR DRINK AFTER MIDNIGHT ON SUN JAN 8, 2023.    SURGERY:  MON JAN 9, 2023, TIME OF ARRIVAL TO BE DETERMINED.    PLEASE HAVE ADULT  WITH YOU.    EXCEPT TO BE ADMITTED TO HOSPITAL AT [facility] FOR 1 - 2 DAY STAY.    IF YOU HAVE ANY QUESTIONS OR CONCERNS PLEASE CONTACT OUR CLINIC AT [phone].    REMINDER LETTER AND DIET PACKET PLACED IN OUT GOING MAIL.

## 2022-12-27 ENCOUNTER — TELEPHONE (OUTPATIENT)
Dept: SURGERY | Facility: CLINIC | Age: 52
End: 2022-12-27

## 2022-12-27 NOTE — TELEPHONE ENCOUNTER
SPOKE TO KENISHA WITH THE FOLLOWING SURGERY SCHEDULE:    PAT: (PRE OP TESTING) FRI 12/30/22 @ 12:45 PM AT [facility] OUTPATIENT SURGERY CENTER.    NOTHING TO EAT OR DRINK AFTER MIDNIGHT SUN 01/01/23.    SURGERY:  MON 01/02/23, TIME TO BE DETERMINED.    PLEASE PREPARE FOR 1 TO 2 DAY HOSPITAL STAY AFTER SURGERY.    PATIENT STATES SHE DID RECEIVE THE [facility] POST SURGERY DIET PACKET.    PATIENT VERBALIZED UNDERSTANDING AND AGREEMENT OF THE ABOVE SCHEDULE, DATE, TIME, LOCATION, PREP, PROCESS.    INSTRUCTED PATIENT TO CONTACT CLINIC [phone] WITH ANY QUESTIONS OR CONCERNS.

## 2022-12-28 NOTE — DISCHARGE INSTRUCTIONS
1/02/23  ARRIVAL TIME PER DR GOMEZ OFFICE  TAKE the following medications the morning of surgery:  All heart or blood pressure medications    HOLD all diabetic medications the morning of surgery as ordered by physician.    Please discontinue all blood thinners and anticoagulants (except aspirin) prior to surgery as per your surgeon and cardiologist instructions.  Aspirin may be continued up to the day prior to surgery.     CHLORHEXIDINE CLOTHS GIVEN WITH INSTRUCTIONS AND FORM TO RETURN TO HOSPITAL, IF APPLICABLE.    General Instructions:  Do not eat or drink after midnight: includes water, mints, or gum. You may brush your teeth.  Dental appliances that are removable must be taken out day of surgery.  Do not smoke, chew tobacco, or drink alcohol.  Bring medications in original bottles, any inhalers and if applicable your C-PAP/BI-PAP machine.  Bring any papers given to you in the doctor's office.  Wear clean comfortable clothes and socks.  Do not wear contact lenses or make-up. Bring a case for your glasses if applicable.  Bring crutches or walker if applicable.  Leave all other valuables and jewelry at home.    If you were given a blood bank ID arm band remember to bring it with you the day of surgery.    Preventing a Surgical Site Infection:  Shower the night before surgery (unless instructed other wise) using a fresh bar of anti-bacterial soap (such as Dial) and clean washcloth. Dry with a clean towel and dress in clean clothing.  For 2 to 3 days before surgery, avoid shaving with a razor near where you will have surgery because the razor can irritate skin and make it easier to develop an infection. Ask your surgeon if you will be receiving antibiotics prior to surgery.  Make sure you, your family, and all healthcare providers clear their hands with soap and water or an alcohol-based hand  before caring for you or your wound.  If at all possible, quit smoking as many days before surgery as you  can.    Day of surgery:  Upon arrival, a Pre-op nurse and Anesthesiologist will review your health history, obtain vital signs, and answer questions you may have. The only belongings needed at this time will be your home medications and if applicable your C-PAP/BI-PAP machine. If you are staying overnight your family can leave the rest of your belongings in the car and bring them to your room later. A Pre-op nurse will start an IV and you may receive medication in preparation for surgery, including something to help you relax. Your family will be able to see you in the Pre-op area. While you are in surgery your family should notify the waiting room  if they leave the waiting room area and provide a contact phone number.    Please be aware that surgery does come with discomfort. We want to make every effort to control your discomfort so please discuss any uncontrolled symptoms with your nurse. Your doctor will most likely have prescribed pain medications.    If you are going home after surgery you will receive individualized written care instructions before being discharged. A responsible adult must drive you to and from the hospital on the day of surgery and stay with you for 24 hours.    If you are staying overnight following surgery, you will be transported to your hospital room following the recovery period.  [facility] has all private rooms.    If you have any questions please call Pre-Admission Testing at [phone].  Deductibles and co-payments are collected on the day of service. Please be prepared to pay the required co-pay, deductible or deposit on the day of service as defined by your plan.    A RESPONSIBLE PERSON MUST REMAIN IN THE WAITING ROOM DURING YOUR PROCEDURE AND A RESPONSIBLE  MUST BE AVAILABLE UPON YOUR DISCHARGE.

## 2022-12-30 ENCOUNTER — LAB (OUTPATIENT)
Dept: LAB | Facility: HOSPITAL | Age: 52
End: 2022-12-30
Payer: COMMERCIAL

## 2022-12-30 ENCOUNTER — PRE-ADMISSION TESTING (OUTPATIENT)
Dept: PREADMISSION TESTING | Facility: HOSPITAL | Age: 52
End: 2022-12-30
Payer: COMMERCIAL

## 2022-12-30 ENCOUNTER — TELEPHONE (OUTPATIENT)
Dept: SURGERY | Facility: CLINIC | Age: 52
End: 2022-12-30

## 2022-12-30 VITALS — HEIGHT: 66 IN | BODY MASS INDEX: 34.55 KG/M2 | WEIGHT: 215 LBS

## 2022-12-30 DIAGNOSIS — Z01.818 PREOP TESTING: ICD-10-CM

## 2022-12-30 LAB — SARS-COV-2 RNA RESP QL NAA+PROBE: NOT DETECTED

## 2022-12-30 PROCEDURE — U0003 INFECTIOUS AGENT DETECTION BY NUCLEIC ACID (DNA OR RNA); SEVERE ACUTE RESPIRATORY SYNDROME CORONAVIRUS 2 (SARS-COV-2) (CORONAVIRUS DISEASE [COVID-19]), AMPLIFIED PROBE TECHNIQUE, MAKING USE OF HIGH THROUGHPUT TECHNOLOGIES AS DESCRIBED BY CMS-2020-01-R: HCPCS

## 2022-12-30 PROCEDURE — 93005 ELECTROCARDIOGRAM TRACING: CPT

## 2022-12-30 PROCEDURE — 36415 COLL VENOUS BLD VENIPUNCTURE: CPT

## 2022-12-30 PROCEDURE — 85025 COMPLETE CBC W/AUTO DIFF WBC: CPT

## 2022-12-30 PROCEDURE — C9803 HOPD COVID-19 SPEC COLLECT: HCPCS

## 2022-12-30 RX ORDER — FAMOTIDINE 40 MG/1
40 TABLET, FILM COATED ORAL NIGHTLY
Status: ON HOLD | COMMUNITY
End: 2023-01-02

## 2022-12-30 RX ORDER — LOSARTAN POTASSIUM AND HYDROCHLOROTHIAZIDE 25; 100 MG/1; MG/1
1 TABLET ORAL DAILY
COMMUNITY
End: 2023-03-29

## 2022-12-30 RX ORDER — TIRZEPATIDE 7.5 MG/.5ML
7.5 INJECTION, SOLUTION SUBCUTANEOUS WEEKLY
COMMUNITY

## 2022-12-30 NOTE — TELEPHONE ENCOUNTER
SPOKE TO PATIENT, PATIENT IS AWARE TO ARRIVE AT 9 AM AT [facility].  PATIENT AWARE OF DATE, TIME, LOCATION, PREP, PROCESS.  PATIENT AWARE OF 1 + DAY STAY IN HOSPITAL FOLLOWING PROCEDURE.    PATIENT HAS RECEIVED THE POST OP DIET PLAN ALREADY.    SHE IS AWARE TO CONTACT CLINIC WITH ANY QUESTIONS OR CONCERNS.  [phone].

## 2022-12-31 LAB
BASOPHILS # BLD AUTO: 0.06 10*3/MM3 (ref 0–0.2)
BASOPHILS NFR BLD AUTO: 0.5 % (ref 0–1.5)
DEPRECATED RDW RBC AUTO: 50.4 FL (ref 37–54)
EOSINOPHIL # BLD AUTO: 0.13 10*3/MM3 (ref 0–0.4)
EOSINOPHIL NFR BLD AUTO: 1.2 % (ref 0.3–6.2)
ERYTHROCYTE [DISTWIDTH] IN BLOOD BY AUTOMATED COUNT: 14.5 % (ref 12.3–15.4)
HCT VFR BLD AUTO: 45.1 % (ref 34–46.6)
HGB BLD-MCNC: 13.7 G/DL (ref 12–15.9)
IMM GRANULOCYTES # BLD AUTO: 0.03 10*3/MM3 (ref 0–0.05)
IMM GRANULOCYTES NFR BLD AUTO: 0.3 % (ref 0–0.5)
LYMPHOCYTES # BLD AUTO: 3.44 10*3/MM3 (ref 0.7–3.1)
LYMPHOCYTES NFR BLD AUTO: 31 % (ref 19.6–45.3)
MCH RBC QN AUTO: 28.8 PG (ref 26.6–33)
MCHC RBC AUTO-ENTMCNC: 30.4 G/DL (ref 31.5–35.7)
MCV RBC AUTO: 94.7 FL (ref 79–97)
MONOCYTES # BLD AUTO: 0.92 10*3/MM3 (ref 0.1–0.9)
MONOCYTES NFR BLD AUTO: 8.3 % (ref 5–12)
NEUTROPHILS NFR BLD AUTO: 58.7 % (ref 42.7–76)
NEUTROPHILS NFR BLD AUTO: 6.5 10*3/MM3 (ref 1.7–7)
NRBC BLD AUTO-RTO: 0 /100 WBC (ref 0–0.2)
PLATELET # BLD AUTO: 279 10*3/MM3 (ref 140–450)
PMV BLD AUTO: 11 FL (ref 6–12)
QT INTERVAL: 386 MS
QTC INTERVAL: 416 MS
RBC # BLD AUTO: 4.76 10*6/MM3 (ref 3.77–5.28)
WBC NRBC COR # BLD: 11.08 10*3/MM3 (ref 3.4–10.8)

## 2023-01-02 ENCOUNTER — ANESTHESIA (OUTPATIENT)
Dept: PERIOP | Facility: HOSPITAL | Age: 53
End: 2023-01-02
Payer: COMMERCIAL

## 2023-01-02 ENCOUNTER — HOSPITAL ENCOUNTER (OUTPATIENT)
Facility: HOSPITAL | Age: 53
Discharge: HOME | End: 2023-01-03
Attending: SURGERY | Admitting: SURGERY
Payer: COMMERCIAL

## 2023-01-02 ENCOUNTER — ANESTHESIA EVENT (OUTPATIENT)
Dept: PERIOP | Facility: HOSPITAL | Age: 53
End: 2023-01-02
Payer: COMMERCIAL

## 2023-01-02 DIAGNOSIS — K44.9 HIATAL HERNIA: Primary | ICD-10-CM

## 2023-01-02 LAB
ANION GAP SERPL CALCULATED.3IONS-SCNC: 9.5 MMOL/L (ref 5–15)
BUN SERPL-MCNC: 17 MG/DL (ref 6–20)
BUN/CREAT SERPL: 22.4 (ref 7–25)
CALCIUM SPEC-SCNC: 9.9 MG/DL (ref 8.6–10.5)
CHLORIDE SERPL-SCNC: 97 MMOL/L (ref 98–107)
CO2 SERPL-SCNC: 26.5 MMOL/L (ref 22–29)
CREAT SERPL-MCNC: 0.76 MG/DL (ref 0.57–1)
EGFRCR SERPLBLD CKD-EPI 2021: 95 ML/MIN/1.73
GLUCOSE BLDC GLUCOMTR-MCNC: 118 MG/DL (ref 70–130)
GLUCOSE BLDC GLUCOMTR-MCNC: 86 MG/DL (ref 70–130)
GLUCOSE SERPL-MCNC: 96 MG/DL (ref 65–99)
POTASSIUM SERPL-SCNC: 3.9 MMOL/L (ref 3.5–5.2)
SODIUM SERPL-SCNC: 133 MMOL/L (ref 136–145)

## 2023-01-02 PROCEDURE — 25010000002 DEXAMETHASONE PER 1 MG: Performed by: NURSE ANESTHETIST, CERTIFIED REGISTERED

## 2023-01-02 PROCEDURE — 25010000002 MIDAZOLAM PER 1 MG: Performed by: NURSE ANESTHETIST, CERTIFIED REGISTERED

## 2023-01-02 PROCEDURE — 0 MEPERIDINE PER 100 MG: Performed by: NURSE ANESTHETIST, CERTIFIED REGISTERED

## 2023-01-02 PROCEDURE — 80048 BASIC METABOLIC PNL TOTAL CA: CPT

## 2023-01-02 PROCEDURE — 82962 GLUCOSE BLOOD TEST: CPT

## 2023-01-02 PROCEDURE — 25010000002 PROPOFOL 10 MG/ML EMULSION: Performed by: NURSE ANESTHETIST, CERTIFIED REGISTERED

## 2023-01-02 PROCEDURE — 43280 LAPAROSCOPY FUNDOPLASTY: CPT | Performed by: SURGERY

## 2023-01-02 PROCEDURE — 25010000002 NEOSTIGMINE 10 MG/10ML SOLUTION: Performed by: NURSE ANESTHETIST, CERTIFIED REGISTERED

## 2023-01-02 PROCEDURE — 25010000002 FENTANYL CITRATE (PF) 50 MCG/ML SOLUTION: Performed by: NURSE ANESTHETIST, CERTIFIED REGISTERED

## 2023-01-02 PROCEDURE — 25010000002 HEPARIN (PORCINE) PER 1000 UNITS: Performed by: SURGERY

## 2023-01-02 PROCEDURE — 25010000002 BUPRENORPHINE PER 0.1 MG: Performed by: NURSE ANESTHETIST, CERTIFIED REGISTERED

## 2023-01-02 PROCEDURE — 25010000002 ONDANSETRON PER 1 MG: Performed by: NURSE ANESTHETIST, CERTIFIED REGISTERED

## 2023-01-02 PROCEDURE — 25010000002 ROPIVACAINE PER 1 MG: Performed by: NURSE ANESTHETIST, CERTIFIED REGISTERED

## 2023-01-02 PROCEDURE — 25010000002 SUCCINYLCHOLINE PER 20 MG: Performed by: NURSE ANESTHETIST, CERTIFIED REGISTERED

## 2023-01-02 PROCEDURE — 25010000002 CEFAZOLIN PER 500 MG: Performed by: SURGERY

## 2023-01-02 PROCEDURE — 36415 COLL VENOUS BLD VENIPUNCTURE: CPT

## 2023-01-02 PROCEDURE — 25010000002 KETOROLAC TROMETHAMINE PER 15 MG: Performed by: NURSE ANESTHETIST, CERTIFIED REGISTERED

## 2023-01-02 PROCEDURE — G0378 HOSPITAL OBSERVATION PER HR: HCPCS

## 2023-01-02 PROCEDURE — 25010000002 ONDANSETRON PER 1 MG: Performed by: SURGERY

## 2023-01-02 DEVICE — ARISTA AH ABSORBABLE HEMOSTATIC PARTICLES
Type: IMPLANTABLE DEVICE | Site: ABDOMEN | Status: FUNCTIONAL
Brand: ARISTA™ AH

## 2023-01-02 DEVICE — PBT NON ABSORBABLE WOUND CLOSURE DEVICE
Type: IMPLANTABLE DEVICE | Site: ABDOMEN | Status: FUNCTIONAL
Brand: V-LOC

## 2023-01-02 RX ORDER — GLYCOPYRROLATE 0.2 MG/ML
INJECTION INTRAMUSCULAR; INTRAVENOUS AS NEEDED
Status: DISCONTINUED | OUTPATIENT
Start: 2023-01-02 | End: 2023-01-02 | Stop reason: SURG

## 2023-01-02 RX ORDER — SODIUM CHLORIDE 9 MG/ML
40 INJECTION, SOLUTION INTRAVENOUS AS NEEDED
Status: DISCONTINUED | OUTPATIENT
Start: 2023-01-02 | End: 2023-01-02 | Stop reason: HOSPADM

## 2023-01-02 RX ORDER — HEPARIN SODIUM 5000 [USP'U]/ML
5000 INJECTION, SOLUTION INTRAVENOUS; SUBCUTANEOUS EVERY 8 HOURS SCHEDULED
Status: DISCONTINUED | OUTPATIENT
Start: 2023-01-02 | End: 2023-01-03 | Stop reason: HOSPADM

## 2023-01-02 RX ORDER — SODIUM CHLORIDE 0.9 % (FLUSH) 0.9 %
3 SYRINGE (ML) INJECTION EVERY 12 HOURS SCHEDULED
Status: DISCONTINUED | OUTPATIENT
Start: 2023-01-02 | End: 2023-01-02 | Stop reason: HOSPADM

## 2023-01-02 RX ORDER — MEPERIDINE HYDROCHLORIDE 25 MG/ML
12.5 INJECTION INTRAMUSCULAR; INTRAVENOUS; SUBCUTANEOUS
Status: COMPLETED | OUTPATIENT
Start: 2023-01-02 | End: 2023-01-02

## 2023-01-02 RX ORDER — FENTANYL CITRATE 50 UG/ML
50 INJECTION, SOLUTION INTRAMUSCULAR; INTRAVENOUS
Status: DISCONTINUED | OUTPATIENT
Start: 2023-01-02 | End: 2023-01-02 | Stop reason: HOSPADM

## 2023-01-02 RX ORDER — SODIUM CHLORIDE, SODIUM LACTATE, POTASSIUM CHLORIDE, CALCIUM CHLORIDE 600; 310; 30; 20 MG/100ML; MG/100ML; MG/100ML; MG/100ML
INJECTION, SOLUTION INTRAVENOUS CONTINUOUS PRN
Status: DISCONTINUED | OUTPATIENT
Start: 2023-01-02 | End: 2023-01-02 | Stop reason: SURG

## 2023-01-02 RX ORDER — SODIUM CHLORIDE, SODIUM LACTATE, POTASSIUM CHLORIDE, CALCIUM CHLORIDE 600; 310; 30; 20 MG/100ML; MG/100ML; MG/100ML; MG/100ML
125 INJECTION, SOLUTION INTRAVENOUS ONCE
Status: COMPLETED | OUTPATIENT
Start: 2023-01-02 | End: 2023-01-02

## 2023-01-02 RX ORDER — EPHEDRINE SULFATE 5 MG/ML
INJECTION INTRAVENOUS AS NEEDED
Status: DISCONTINUED | OUTPATIENT
Start: 2023-01-02 | End: 2023-01-02 | Stop reason: SURG

## 2023-01-02 RX ORDER — SODIUM CHLORIDE 0.9 % (FLUSH) 0.9 %
10 SYRINGE (ML) INJECTION EVERY 12 HOURS SCHEDULED
Status: DISCONTINUED | OUTPATIENT
Start: 2023-01-02 | End: 2023-01-02 | Stop reason: HOSPADM

## 2023-01-02 RX ORDER — NEOSTIGMINE METHYLSULFATE 1 MG/ML
INJECTION, SOLUTION INTRAVENOUS AS NEEDED
Status: DISCONTINUED | OUTPATIENT
Start: 2023-01-02 | End: 2023-01-02 | Stop reason: SURG

## 2023-01-02 RX ORDER — PROCHLORPERAZINE EDISYLATE 5 MG/ML
5 INJECTION INTRAMUSCULAR; INTRAVENOUS EVERY 6 HOURS PRN
Status: DISCONTINUED | OUTPATIENT
Start: 2023-01-02 | End: 2023-01-03 | Stop reason: HOSPADM

## 2023-01-02 RX ORDER — FAMOTIDINE 20 MG/1
40 TABLET, FILM COATED ORAL DAILY
Status: CANCELLED | OUTPATIENT
Start: 2023-01-02

## 2023-01-02 RX ORDER — SODIUM CHLORIDE 0.9 % (FLUSH) 0.9 %
10 SYRINGE (ML) INJECTION AS NEEDED
Status: DISCONTINUED | OUTPATIENT
Start: 2023-01-02 | End: 2023-01-02 | Stop reason: HOSPADM

## 2023-01-02 RX ORDER — SODIUM CHLORIDE, SODIUM LACTATE, POTASSIUM CHLORIDE, CALCIUM CHLORIDE 600; 310; 30; 20 MG/100ML; MG/100ML; MG/100ML; MG/100ML
42 INJECTION, SOLUTION INTRAVENOUS CONTINUOUS
Status: DISCONTINUED | OUTPATIENT
Start: 2023-01-02 | End: 2023-01-03 | Stop reason: HOSPADM

## 2023-01-02 RX ORDER — LIDOCAINE HYDROCHLORIDE 20 MG/ML
INJECTION, SOLUTION EPIDURAL; INFILTRATION; INTRACAUDAL; PERINEURAL AS NEEDED
Status: DISCONTINUED | OUTPATIENT
Start: 2023-01-02 | End: 2023-01-02 | Stop reason: SURG

## 2023-01-02 RX ORDER — SCOLOPAMINE TRANSDERMAL SYSTEM 1 MG/1
1 PATCH, EXTENDED RELEASE TRANSDERMAL CONTINUOUS
Status: DISCONTINUED | OUTPATIENT
Start: 2023-01-02 | End: 2023-01-03 | Stop reason: HOSPADM

## 2023-01-02 RX ORDER — METHOCARBAMOL 750 MG/1
750 TABLET, FILM COATED ORAL EVERY 6 HOURS PRN
Status: DISCONTINUED | OUTPATIENT
Start: 2023-01-02 | End: 2023-01-03 | Stop reason: HOSPADM

## 2023-01-02 RX ORDER — SIMETHICONE 80 MG
80 TABLET,CHEWABLE ORAL 4 TIMES DAILY PRN
Status: DISCONTINUED | OUTPATIENT
Start: 2023-01-02 | End: 2023-01-03 | Stop reason: HOSPADM

## 2023-01-02 RX ORDER — ROCURONIUM BROMIDE 10 MG/ML
INJECTION, SOLUTION INTRAVENOUS AS NEEDED
Status: DISCONTINUED | OUTPATIENT
Start: 2023-01-02 | End: 2023-01-02 | Stop reason: SURG

## 2023-01-02 RX ORDER — FAMOTIDINE 40 MG/1
40 TABLET, FILM COATED ORAL DAILY
COMMUNITY
End: 2023-01-03 | Stop reason: HOSPADM

## 2023-01-02 RX ORDER — BENZONATATE 100 MG/1
100 CAPSULE ORAL 3 TIMES DAILY PRN
Status: DISCONTINUED | OUTPATIENT
Start: 2023-01-02 | End: 2023-01-03 | Stop reason: HOSPADM

## 2023-01-02 RX ORDER — BUPRENORPHINE HYDROCHLORIDE 0.32 MG/ML
INJECTION INTRAMUSCULAR; INTRAVENOUS AS NEEDED
Status: DISCONTINUED | OUTPATIENT
Start: 2023-01-02 | End: 2023-01-02 | Stop reason: SURG

## 2023-01-02 RX ORDER — MIDAZOLAM HYDROCHLORIDE 1 MG/ML
1 INJECTION INTRAMUSCULAR; INTRAVENOUS
Status: DISCONTINUED | OUTPATIENT
Start: 2023-01-02 | End: 2023-01-02 | Stop reason: HOSPADM

## 2023-01-02 RX ORDER — PROPOFOL 10 MG/ML
VIAL (ML) INTRAVENOUS AS NEEDED
Status: DISCONTINUED | OUTPATIENT
Start: 2023-01-02 | End: 2023-01-02 | Stop reason: SURG

## 2023-01-02 RX ORDER — ONDANSETRON 2 MG/ML
4 INJECTION INTRAMUSCULAR; INTRAVENOUS EVERY 6 HOURS SCHEDULED
Status: DISCONTINUED | OUTPATIENT
Start: 2023-01-02 | End: 2023-01-03 | Stop reason: HOSPADM

## 2023-01-02 RX ORDER — SUCCINYLCHOLINE CHLORIDE 20 MG/ML
INJECTION INTRAMUSCULAR; INTRAVENOUS AS NEEDED
Status: DISCONTINUED | OUTPATIENT
Start: 2023-01-02 | End: 2023-01-02 | Stop reason: SURG

## 2023-01-02 RX ORDER — MAGNESIUM HYDROXIDE 1200 MG/15ML
LIQUID ORAL AS NEEDED
Status: DISCONTINUED | OUTPATIENT
Start: 2023-01-02 | End: 2023-01-02 | Stop reason: HOSPADM

## 2023-01-02 RX ORDER — ROPIVACAINE HYDROCHLORIDE 5 MG/ML
INJECTION, SOLUTION EPIDURAL; INFILTRATION; PERINEURAL AS NEEDED
Status: DISCONTINUED | OUTPATIENT
Start: 2023-01-02 | End: 2023-01-02 | Stop reason: SURG

## 2023-01-02 RX ORDER — FENTANYL CITRATE 50 UG/ML
INJECTION, SOLUTION INTRAMUSCULAR; INTRAVENOUS AS NEEDED
Status: DISCONTINUED | OUTPATIENT
Start: 2023-01-02 | End: 2023-01-02 | Stop reason: SURG

## 2023-01-02 RX ORDER — FAMOTIDINE 10 MG/ML
INJECTION, SOLUTION INTRAVENOUS AS NEEDED
Status: DISCONTINUED | OUTPATIENT
Start: 2023-01-02 | End: 2023-01-02 | Stop reason: SURG

## 2023-01-02 RX ORDER — ONDANSETRON 2 MG/ML
INJECTION INTRAMUSCULAR; INTRAVENOUS AS NEEDED
Status: DISCONTINUED | OUTPATIENT
Start: 2023-01-02 | End: 2023-01-02 | Stop reason: SURG

## 2023-01-02 RX ORDER — DEXAMETHASONE SODIUM PHOSPHATE 4 MG/ML
INJECTION, SOLUTION INTRA-ARTICULAR; INTRALESIONAL; INTRAMUSCULAR; INTRAVENOUS; SOFT TISSUE AS NEEDED
Status: DISCONTINUED | OUTPATIENT
Start: 2023-01-02 | End: 2023-01-02 | Stop reason: SURG

## 2023-01-02 RX ORDER — NEBIVOLOL 10 MG/1
10 TABLET ORAL DAILY
Status: DISCONTINUED | OUTPATIENT
Start: 2023-01-03 | End: 2023-01-03 | Stop reason: HOSPADM

## 2023-01-02 RX ORDER — KETOROLAC TROMETHAMINE 30 MG/ML
30 INJECTION, SOLUTION INTRAMUSCULAR; INTRAVENOUS EVERY 6 HOURS PRN
Status: COMPLETED | OUTPATIENT
Start: 2023-01-02 | End: 2023-01-02

## 2023-01-02 RX ORDER — MIDAZOLAM HYDROCHLORIDE 1 MG/ML
INJECTION INTRAMUSCULAR; INTRAVENOUS AS NEEDED
Status: DISCONTINUED | OUTPATIENT
Start: 2023-01-02 | End: 2023-01-02 | Stop reason: SURG

## 2023-01-02 RX ORDER — OXYCODONE HYDROCHLORIDE AND ACETAMINOPHEN 5; 325 MG/1; MG/1
1 TABLET ORAL ONCE AS NEEDED
Status: DISCONTINUED | OUTPATIENT
Start: 2023-01-02 | End: 2023-01-02 | Stop reason: HOSPADM

## 2023-01-02 RX ORDER — ONDANSETRON 2 MG/ML
4 INJECTION INTRAMUSCULAR; INTRAVENOUS AS NEEDED
Status: DISCONTINUED | OUTPATIENT
Start: 2023-01-02 | End: 2023-01-02 | Stop reason: HOSPADM

## 2023-01-02 RX ORDER — IPRATROPIUM BROMIDE AND ALBUTEROL SULFATE 2.5; .5 MG/3ML; MG/3ML
3 SOLUTION RESPIRATORY (INHALATION) ONCE AS NEEDED
Status: DISCONTINUED | OUTPATIENT
Start: 2023-01-02 | End: 2023-01-02 | Stop reason: HOSPADM

## 2023-01-02 RX ORDER — SODIUM CHLORIDE, SODIUM LACTATE, POTASSIUM CHLORIDE, CALCIUM CHLORIDE 600; 310; 30; 20 MG/100ML; MG/100ML; MG/100ML; MG/100ML
100 INJECTION, SOLUTION INTRAVENOUS ONCE AS NEEDED
Status: DISCONTINUED | OUTPATIENT
Start: 2023-01-02 | End: 2023-01-02 | Stop reason: HOSPADM

## 2023-01-02 RX ADMIN — SUCCINYLCHOLINE CHLORIDE 120 MG: 20 INJECTION, SOLUTION INTRAMUSCULAR; INTRAVENOUS at 10:04

## 2023-01-02 RX ADMIN — SODIUM CHLORIDE, POTASSIUM CHLORIDE, SODIUM LACTATE AND CALCIUM CHLORIDE 125 ML/HR: 600; 310; 30; 20 INJECTION, SOLUTION INTRAVENOUS at 09:40

## 2023-01-02 RX ADMIN — SODIUM CHLORIDE, POTASSIUM CHLORIDE, SODIUM LACTATE AND CALCIUM CHLORIDE: 600; 310; 30; 20 INJECTION, SOLUTION INTRAVENOUS at 11:07

## 2023-01-02 RX ADMIN — SCOPOLAMINE 1 PATCH: 1.5 PATCH, EXTENDED RELEASE TRANSDERMAL at 09:25

## 2023-01-02 RX ADMIN — HYDROCODONE BITARTRATE AND ACETAMINOPHEN 10 ML: 7.5; 325 SOLUTION ORAL at 15:36

## 2023-01-02 RX ADMIN — ONDANSETRON 4 MG: 2 INJECTION INTRAMUSCULAR; INTRAVENOUS at 10:41

## 2023-01-02 RX ADMIN — FENTANYL CITRATE 50 MCG: 50 INJECTION, SOLUTION INTRAMUSCULAR; INTRAVENOUS at 13:00

## 2023-01-02 RX ADMIN — ONDANSETRON 4 MG: 2 INJECTION INTRAMUSCULAR; INTRAVENOUS at 18:38

## 2023-01-02 RX ADMIN — BUPRENORPHINE HYDROCHLORIDE 0.3 MG: 0.32 INJECTION INTRAMUSCULAR; INTRAVENOUS at 10:17

## 2023-01-02 RX ADMIN — CEFAZOLIN 2 G: 2 INJECTION, POWDER, FOR SOLUTION INTRAMUSCULAR; INTRAVENOUS at 09:57

## 2023-01-02 RX ADMIN — EPHEDRINE SULFATE 10 MG: 5 INJECTION INTRAVENOUS at 10:11

## 2023-01-02 RX ADMIN — PROPOFOL 200 MG: 10 INJECTION, EMULSION INTRAVENOUS at 10:04

## 2023-01-02 RX ADMIN — SODIUM CHLORIDE, POTASSIUM CHLORIDE, SODIUM LACTATE AND CALCIUM CHLORIDE: 600; 310; 30; 20 INJECTION, SOLUTION INTRAVENOUS at 09:57

## 2023-01-02 RX ADMIN — MIDAZOLAM 2 MG: 1 INJECTION INTRAMUSCULAR; INTRAVENOUS at 09:58

## 2023-01-02 RX ADMIN — HYDROCODONE BITARTRATE AND ACETAMINOPHEN 10 ML: 7.5; 325 SOLUTION ORAL at 19:46

## 2023-01-02 RX ADMIN — ONDANSETRON 4 MG: 2 INJECTION INTRAMUSCULAR; INTRAVENOUS at 14:27

## 2023-01-02 RX ADMIN — DEXAMETHASONE SODIUM PHOSPHATE 8 MG: 4 INJECTION, SOLUTION INTRA-ARTICULAR; INTRALESIONAL; INTRAMUSCULAR; INTRAVENOUS; SOFT TISSUE at 10:17

## 2023-01-02 RX ADMIN — ROPIVACAINE HYDROCHLORIDE 290 MG: 5 INJECTION, SOLUTION EPIDURAL; INFILTRATION; PERINEURAL at 10:17

## 2023-01-02 RX ADMIN — EPHEDRINE SULFATE 10 MG: 5 INJECTION INTRAVENOUS at 10:16

## 2023-01-02 RX ADMIN — ROCURONIUM BROMIDE 10 MG: 10 INJECTION, SOLUTION INTRAVENOUS at 11:45

## 2023-01-02 RX ADMIN — ROCURONIUM BROMIDE 30 MG: 10 INJECTION, SOLUTION INTRAVENOUS at 10:18

## 2023-01-02 RX ADMIN — FAMOTIDINE 20 MG: 10 INJECTION, SOLUTION INTRAVENOUS at 09:57

## 2023-01-02 RX ADMIN — HEPARIN SODIUM 5000 UNITS: 5000 INJECTION INTRAVENOUS; SUBCUTANEOUS at 19:46

## 2023-01-02 RX ADMIN — SODIUM CHLORIDE, POTASSIUM CHLORIDE, SODIUM LACTATE AND CALCIUM CHLORIDE 42 ML/HR: 600; 310; 30; 20 INJECTION, SOLUTION INTRAVENOUS at 16:24

## 2023-01-02 RX ADMIN — FENTANYL CITRATE 100 MCG: 50 INJECTION, SOLUTION INTRAMUSCULAR; INTRAVENOUS at 09:58

## 2023-01-02 RX ADMIN — GLYCOPYRROLATE 0.4 MG: 0.2 INJECTION INTRAMUSCULAR; INTRAVENOUS at 12:37

## 2023-01-02 RX ADMIN — MEPERIDINE HYDROCHLORIDE 12.5 MG: 25 INJECTION, SOLUTION INTRAMUSCULAR; INTRAVENOUS; SUBCUTANEOUS at 13:12

## 2023-01-02 RX ADMIN — SIMETHICONE 80 MG: 80 TABLET, CHEWABLE ORAL at 17:25

## 2023-01-02 RX ADMIN — FENTANYL CITRATE 50 MCG: 50 INJECTION, SOLUTION INTRAMUSCULAR; INTRAVENOUS at 13:12

## 2023-01-02 RX ADMIN — LIDOCAINE HYDROCHLORIDE 60 MG: 20 INJECTION, SOLUTION EPIDURAL; INFILTRATION; INTRACAUDAL; PERINEURAL at 10:04

## 2023-01-02 RX ADMIN — EPHEDRINE SULFATE 10 MG: 5 INJECTION INTRAVENOUS at 10:07

## 2023-01-02 RX ADMIN — ROCURONIUM BROMIDE 10 MG: 10 INJECTION, SOLUTION INTRAVENOUS at 10:04

## 2023-01-02 RX ADMIN — MEPERIDINE HYDROCHLORIDE 12.5 MG: 25 INJECTION, SOLUTION INTRAMUSCULAR; INTRAVENOUS; SUBCUTANEOUS at 13:03

## 2023-01-02 RX ADMIN — NEOSTIGMINE METHYLSULFATE 3 MG: 0.5 INJECTION INTRAVENOUS at 12:37

## 2023-01-02 RX ADMIN — KETOROLAC TROMETHAMINE 30 MG: 30 INJECTION, SOLUTION INTRAMUSCULAR; INTRAVENOUS at 13:03

## 2023-01-02 NOTE — ANESTHESIA PROCEDURE NOTES
Peripheral Block      Patient reassessed immediately prior to procedure    Patient location during procedure: OR  Start time: 1/2/2023 10:13 AM  Stop time: 1/2/2023 10:18 AM  Reason for block: at surgeon's request and post-op pain management  Performed by  CRNA/CAA: Sarika Joel CRNA  Preanesthetic Checklist  Completed: patient identified, IV checked, site marked, risks and benefits discussed, surgical consent, monitors and equipment checked, pre-op evaluation and timeout performed  Prep:  Pt Position: supine  Sterile barriers:cap, gloves, sterile barriers and mask  Prep: ChloraPrep  Patient monitoring: blood pressure monitoring, continuous pulse oximetry and EKG  Procedure    Sedation: yes (Sedation, GA, Spinal,Epidural )  Performed under: general  Guidance:ultrasound guided    ULTRASOUND INTERPRETATION.  Using ultrasound guidance a 20 G (20g 4\" Stimuplex) gauge needle was placed in close proximity to the nerve, at which point, under ultrasound guidance anesthetic was injected in the area of the nerve and spread of the anesthesia was seen on ultrasound in close proximity thereto.  There were no abnormalities seen on ultrasound; a digital image was taken; and the patient tolerated the procedure with no complications. Images:still images obtained    Laterality:Bilateral  Block Type:TAP  Injection Technique:single-shot  Needle Type:short-bevel  Needle Gauge:20 G  Resistance on Injection: none          Medications  Comment:Block Injection:  Total volume divided equally between all 4 injection sites      Post Assessment  Injection Assessment: negative aspiration for heme, incremental injection and no paresthesia on injection  Patient Tolerance:comfortable throughout block  Complications:no  Additional Notes  The pt was in the supine position under general anesthesia    Under Ultrasound guidance, a BBraun 4inch 360 degree needle was advanced with Normal Saline hydro dissection of tissue.  The Internal Oblique and  Transversus Abdominus muscles where visualized.  At or before the aponeurosis of Internal Oblique, local anesthetic spread was visualized in the Transversus Abdominus Plane. Injection was made incrementally with aspiration every 5 mls.  There was no  intravascular injection,  injection pressure was normal, there was no neural injection, and the procedure was completed without difficulty. The same procedure was completed for left and right sided lateral tap blocks.    Under Ultrasound guidance, a Ceballos 4inch 360 degree needle was advanced with Normal Saline hydro dissection of tissue.  The Rectus and Transversus Abdominus muscles where visualized.  The needle tip was placed between the Transversus Abdominus and rectus abdominus, local anesthetic spread was visualized in the Transversus Abdominus Plane. Injection was made incrementally with aspiration every 5 mls.  There was no  intravascular injection,  injection pressure was normal, there was no neural injection, and the procedure was completed without difficulty. The same procedure was completed for left and right sided subcostal tap blocks. Thank You.

## 2023-01-02 NOTE — ANESTHESIA PREPROCEDURE EVALUATION
Anesthesia Evaluation     no history of anesthetic complications:  NPO Solid Status: > 8 hours  NPO Liquid Status: > 8 hours           Airway   Mallampati: I  TM distance: >3 FB  Neck ROM: full  No difficulty expected  Dental - normal exam     Pulmonary - normal exam   (+) a smoker Former, shortness of breath, sleep apnea,   Cardiovascular - normal exam    (+) hypertension, CHF , hyperlipidemia,       Neuro/Psych  (+) headaches, psychiatric history ADHD, Anxiety and Depression,    GI/Hepatic/Renal/Endo    (+) obesity,  hiatal hernia, GERD,  diabetes mellitus,   (-) morbid obesity    Musculoskeletal     Abdominal  - normal exam    Bowel sounds: normal.   Substance History      OB/GYN          Other   arthritis,          Phys Exam Other: Upper veneers                Anesthesia Plan    ASA 2     general with block     intravenous induction     Anesthetic plan, risks, benefits, and alternatives have been provided, discussed and informed consent has been obtained with: patient.        CODE STATUS:

## 2023-01-02 NOTE — PLAN OF CARE
Goal Outcome Evaluation:  Plan of Care Reviewed With: patient, friend        Progress: improving  Outcome Evaluation: Patient recieved to unit from PACU. Lap sites x4 clean and well approximated; DEVAUGHN. Pt c/o \"sharp, stabbing\" left shoulder pain; PRN pain medication administered x1. Pt ambulated down unit hallway x1 standby assist. Voiding spontaneously without difficulty. LR infusing at 42 mL/hr. Pt tolerating clear liquids at this time. No complaints or acute changes. VSS.

## 2023-01-02 NOTE — OP NOTE
OPERATIVE NOTE    Patient Name:  Yodit Sotelo  YOB: 1970  [id number]    1/2/2023        PREOPERATIVE DIAGNOSIS: Type 1 hiatal hernia without  obstruction, medically refractory gastroesophageal reflux disease        POSTOPERATIVE DIAGNOSIS: Same         PROCEDURE PERFORMED:    Robotic-assisted laparoscopic paraesophageal hernia repair without mesh, toupet fundoplication, esophagogastroduodenoscopy     SURGEON: Carlos Lemon MD ,        SPECIMENS: None         ANESTHESIA: General. JULISSA block        GRAFTS/IMPLANTS: Rocío        FINDINGS:   1.  Sliding type paraesophageal hernia completely reduced and repaired with a posterior hiatal cruroplasty  2. Toupet fundoplication performed around 51 Nigerian bougie           INDICATIONS:    Yodit Sotelo is a very pleasant 52 y.o. female with a history of medically refractory reflux disease and sliding hiatal hernia . After a complete preoperative workup they were deemed an operative candidate. The risks and benefits were discussed at length with the patient and their family and they agreed to proceed.         DESCRIPTION OF PROCEDURE:      After obtaining informed consent, the patient was taken to the operating room and placed in supine position. After appropriate DVT and antibiotic prophylaxis, general anesthesia was induced. TAP blocks were placed by the anesthesia staff bilaterally to aid in post-op pain control . The abdomen  was prepped and draped in standard sterile fashion      The abdomen was entered in the left hemiabdomen, just superior to the level of the umbilicus, with an 8 mm robotic trocar gasless.  Abdomen was insufflated to 15 mmHg.  Three additional, 8 mm robotic trocars were placed in a line just superior to the umbilicus under direct laparoscopic visualization.    Robot was docked.  The liver was retracted cephalad with a \"Earl\" stitch utilizing a 0 V-Loc suture.     Pars flaccida along the lesser curvature was transected with cautery to  identify the right sakshi.  Dissection was then carried around the anterior aspect of the esophageal hiatus over to beginning of the left sakshi.  Blunt and cautery dissection was carried out in the mediastinum and the hiatal hernia was able to be reduced.   Care was taken to preserve the vagus nerve.   The short gastrics along the greater curvature were taken down with robotic vessel sealer and the remainder of the dissection along the left sakshi was undertaken with blunt dissection.  A retroesophageal window was created.  At this point, the entirety of the esophageal hiatus was able to be visualized with no abdominal contents herniating up into the mediastinum.  There was appropriate esophageal length, off tension.  The esophagus was retracted to the patient's left upper quadrant with the fourth robotic arm.  Some Rocío was placed within the mediastinum to minimize perioperative fluid collection development. Esophageal hiatus was closed with a 2-0 Stratafix  suture in a running manner.  I was able to easily place my Cadiere into the esophageal hiatus to ensure that the hiatal closure was not too tight.      Toupet  fundoplication was performed over a 51French bougie utilizing 2-0 silk suture.  The most cephalad sutures were also used to tack the fundoplication to the hiatus.  Bougie was removed.  Remainder of the Rocío was then placed over the surgical site.  Esophagogastroduodenoscopy was performed.  I was able to pass the scope through the fundoplication.  Retroflexed view of the esophageal hiatus demonstrated appropriate wrap.  Stomach was suctioned out and the endoscope was removed without complication.     The robot was undocked.    Abdominal insufflation was released and the trocars were removed.  Skin closed with 2-0 Monocryl and Skin Affix.     All sponge and needle counts were correct times two at the completion of the procedure. The patient recovered from anesthesia, was extubated in the operating room  and  was transported to the PACU  in stable condition.    Carlos Lemon MD  1/2/2023  12:47 EST

## 2023-01-02 NOTE — ANESTHESIA POSTPROCEDURE EVALUATION
Patient: Yodit Sotelo    Procedure Summary     Date: 01/02/23 Room / Location: [facility] OR  /  [facility] OR    Anesthesia Start: 0957 Anesthesia Stop: 1249    Procedures:       LAPAROSCOPIC HIATAL HERNIA REPAIR WITH DAVINCI ROBOT (Abdomen)      ESOPHAGOGASTRODUODENOSCOPY (Esophagus) Diagnosis:       Hiatal hernia      (Hiatal hernia [K44.9])    Surgeons: Carlos Lemon MD Provider: Rich Salazar MD    Anesthesia Type: general with block ASA Status: 2          Anesthesia Type: general with block    Vitals  Vitals Value Taken Time   /65 01/02/23 1325   Temp 97 °F (36.1 °C) 01/02/23 1250   Pulse 66 01/02/23 1325   Resp 12 01/02/23 1325   SpO2 96 % 01/02/23 1325           Post Anesthesia Care and Evaluation    Patient location during evaluation: PHASE II  Patient participation: complete - patient participated  Level of consciousness: awake and alert  Pain score: 0  Pain management: adequate    Airway patency: patent  Anesthetic complications: No anesthetic complications    Cardiovascular status: acceptable  Respiratory status: acceptable  Hydration status: acceptable

## 2023-01-02 NOTE — INTERVAL H&P NOTE
HPI reviewed. No interval changes. To the OR for robotic assisted laparoscopic hiatal hernia repair with possible mesh, toupet fundoplication

## 2023-01-02 NOTE — ANESTHESIA PROCEDURE NOTES
Airway  Urgency: elective    Date/Time: 1/2/2023 10:05 AM  Airway not difficult    General Information and Staff    Patient location during procedure: OR  Anesthesiologist: Rich Salazar MD CRNA/CAA: Sarika Joel CRNA    Indications and Patient Condition  Indications for airway management: airway protection    Preoxygenated: yes  MILS maintained throughout  Mask difficulty assessment: 2 - vent by mask + OA or adjuvant +/- NMBA    Final Airway Details  Final airway type: endotracheal airway      Successful airway: ETT  Cuffed: yes   Successful intubation technique: direct laryngoscopy  Endotracheal tube insertion site: oral  Blade: Mariana  Blade size: 3  ETT size (mm): 7.5  Cormack-Lehane Classification: grade IIa - partial view of glottis  Placement verified by: chest auscultation, capnometry and palpation of cuff   Cuff volume (mL): 6  Measured from: lips  ETT/EBT  to lips (cm): 22  Number of attempts at approach: 1  Assessment: lips, teeth, and gum same as pre-op and atraumatic intubation    Additional Comments  Dentition as preop. No complications noted. Patient tolerated well.  ETT secured.

## 2023-01-03 VITALS
HEART RATE: 70 BPM | RESPIRATION RATE: 18 BRPM | HEIGHT: 66 IN | OXYGEN SATURATION: 94 % | WEIGHT: 216.8 LBS | TEMPERATURE: 98.1 F | SYSTOLIC BLOOD PRESSURE: 105 MMHG | DIASTOLIC BLOOD PRESSURE: 61 MMHG | BODY MASS INDEX: 34.84 KG/M2

## 2023-01-03 PROCEDURE — G0378 HOSPITAL OBSERVATION PER HR: HCPCS

## 2023-01-03 PROCEDURE — 25010000002 ONDANSETRON PER 1 MG: Performed by: SURGERY

## 2023-01-03 PROCEDURE — 25010000002 HEPARIN (PORCINE) PER 1000 UNITS: Performed by: SURGERY

## 2023-01-03 RX ORDER — TRAMADOL HYDROCHLORIDE 50 MG/1
50 TABLET ORAL EVERY 6 HOURS PRN
Qty: 12 TABLET | Refills: 0 | Status: SHIPPED | OUTPATIENT
Start: 2023-01-03 | End: 2023-03-29

## 2023-01-03 RX ORDER — METHOCARBAMOL 750 MG/1
750 TABLET, FILM COATED ORAL EVERY 6 HOURS PRN
Qty: 10 TABLET | Refills: 0 | Status: SHIPPED | OUTPATIENT
Start: 2023-01-03 | End: 2023-03-29

## 2023-01-03 RX ADMIN — ONDANSETRON 4 MG: 2 INJECTION INTRAMUSCULAR; INTRAVENOUS at 12:13

## 2023-01-03 RX ADMIN — Medication 2 SPRAY: at 05:37

## 2023-01-03 RX ADMIN — NEBIVOLOL HYDROCHLORIDE 10 MG: 10 TABLET ORAL at 08:26

## 2023-01-03 RX ADMIN — HYDROCODONE BITARTRATE AND ACETAMINOPHEN 10 ML: 7.5; 325 SOLUTION ORAL at 06:19

## 2023-01-03 RX ADMIN — HYDROCODONE BITARTRATE AND ACETAMINOPHEN 10 ML: 7.5; 325 SOLUTION ORAL at 11:06

## 2023-01-03 RX ADMIN — SIMETHICONE 80 MG: 80 TABLET, CHEWABLE ORAL at 05:44

## 2023-01-03 RX ADMIN — SIMETHICONE 80 MG: 80 TABLET, CHEWABLE ORAL at 00:03

## 2023-01-03 RX ADMIN — HEPARIN SODIUM 5000 UNITS: 5000 INJECTION INTRAVENOUS; SUBCUTANEOUS at 05:38

## 2023-01-03 RX ADMIN — Medication 2 SPRAY: at 00:54

## 2023-01-03 RX ADMIN — ONDANSETRON 4 MG: 2 INJECTION INTRAMUSCULAR; INTRAVENOUS at 00:03

## 2023-01-03 RX ADMIN — ONDANSETRON 4 MG: 2 INJECTION INTRAMUSCULAR; INTRAVENOUS at 05:38

## 2023-01-03 NOTE — PLAN OF CARE
Goal Outcome Evaluation:  Plan of Care Reviewed With: patient      Patient ambulating frequently in hallway. Incisions are clean, dry, and well approximated. Voids spontaneously without difficulty. Vital signs stable. Tolerating clear diet. PRN medications administered for pain x2 during this shift.

## 2023-01-03 NOTE — DISCHARGE INSTRUCTIONS
No lifting more than 15 or 20 pounds for 6 weeks to avoid development of incisional hernia.  2.   Notify Dr. Lemon's office if you experience any of the following: fever >101.5, nausea or vomiting affecting oral intake or causing dehydration.  4.   Shower daily. Allow warm, soapy water to run over incisions. Pat dry.        -Do not submerge wounds in a tub bath or swimming for 2 weeks  5.   Narcotics can cause constipation so it is important to wean off of the medications as soon as possible.  6.   Follow post fundoplication diet plan

## 2023-01-03 NOTE — DISCHARGE SUMMARY
Discharge Summary    Patient Name:  Yodit Sotelo  YOB: 1970  [id number]      DATE OF ADMISSION: 1/2/2023    DATE OF DISCHARGE: 1/3/2023       FINAL DIAGNOSES:   Patient Active Problem List   Diagnosis   • Morbid obesity (HCC)   • Fatigue   • Dyspepsia   • Dyspnea on exertion   • GERD (gastroesophageal reflux disease)   • DM2 (diabetes mellitus, type 2) ([facility])   • Anxiety and depression   • HTN (hypertension)   • CHF (congestive heart failure) ([facility])   • Osteoarthritis   • Migraines   • Former smoker   • Sleep apnea   • ADD (attention deficit disorder)   • Hiatal hernia       CONSULTING SERVICES: None      PROCEDURES PERFORMED:   1.Robotic assisted laparoscopic hiatal hernia repair without mesh, Toupet fundoplication, EGD 1/2    HISTORY: Yodit Sotelo is a 52 y.o. obese female who presents to my clinic with progressively worsening dry cough, voice changes, sore throat.  She has had what she thought was sinus drainage issues for several years.  She has seen ENT and trialed multiple antihistamines.  Reports globus sensation and throat.  She has been on omeprazole for several years for acid reflux.  She denies substernal chest burning or dysphagia.  She denies unintentional weight loss.  At the time that she held her PPI for her EGD Bravo, she states her symptoms were worse.  She is very distraught about her symptoms of voice changes, dry cough, sore throat, throat clearing. [place] elevated.     HOSPITAL COURSE: Patient arrived for her scheduled procedure 1/2. She underwent robotic hiatal hernia repair and tolerated well. Pain was controlled postop. She was tolerating liquids POD1 with just mild belching.      CONDITION: At the time of discharge, the patient is tolerating a Postfundoplication clear liquid diet without difficulty. she is having normal bowel and bladder function. her incisions are clean, dry and intact.      DISCHARGE MEDICATIONS:   1. Continue home meds except famotidine  2. Tramadol 50  mg PO q4h PRN severe pain  3. Robaxin for additional pain control     DISCHARGE INSTRUCTIONS:  1. The patient is instructed to follow up with Dr. Lemon in 2 weeks.  2. she is instructed to refrain from lifting more than 15 or 20 pounds for 6 weeks to avoid development of incisional hernia.  3. If the patient has worsening problems with fever >101.5, nausea or vomiting affecting oral intake or causing dehydration, she is to contact Dr. Lemon's office  4. Shower daily. Allow warm, soapy water to run over incisions. Pat dry.        -Do not submerge wounds in a tub bath or swimming for 2 weeks  5. Narcotics can cause constipation so it is important to wean off of the medications as soon as possible.  6. Follow post fundoplication diet plan      Carlos Lemon MD  1/3/2023  11:56 EST

## 2023-01-03 NOTE — PLAN OF CARE
Goal Outcome Evaluation:  Plan of Care Reviewed With: patient        Progress: improving  Outcome Evaluation: Patient ambulating independently and voiding spontaneously without difficulty. Lap sites x4 clean and well approximated. PRN pain medication administered x1. Pt tolerating post-fundop diet. No concerns or acute changes. VSS. Pt is being discharged today; order in place per MD.

## 2023-01-06 ENCOUNTER — APPOINTMENT (OUTPATIENT)
Dept: PREADMISSION TESTING | Facility: HOSPITAL | Age: 53
End: 2023-01-06

## 2023-01-19 ENCOUNTER — OFFICE VISIT (OUTPATIENT)
Dept: SURGERY | Facility: CLINIC | Age: 53
End: 2023-01-19
Payer: COMMERCIAL

## 2023-01-19 VITALS
SYSTOLIC BLOOD PRESSURE: 110 MMHG | DIASTOLIC BLOOD PRESSURE: 78 MMHG | BODY MASS INDEX: 33.01 KG/M2 | HEIGHT: 66 IN | HEART RATE: 86 BPM | WEIGHT: 205.4 LBS

## 2023-01-19 DIAGNOSIS — K21.9 GASTROESOPHAGEAL REFLUX DISEASE WITHOUT ESOPHAGITIS: ICD-10-CM

## 2023-01-19 DIAGNOSIS — K44.9 HIATAL HERNIA: Primary | ICD-10-CM

## 2023-01-19 PROCEDURE — 99024 POSTOP FOLLOW-UP VISIT: CPT | Performed by: SURGERY

## 2023-02-08 ENCOUNTER — TELEPHONE (OUTPATIENT)
Dept: CARDIOLOGY | Facility: CLINIC | Age: 53
End: 2023-02-08
Payer: COMMERCIAL

## 2023-02-08 NOTE — TELEPHONE ENCOUNTER
For the HUB to read to pt:         OUSMANE with patient to reschedule her appointment on 02/15/2023 as Izabela Solorio will not be in clinic that day    Kenalog Preparation: Kenalog

## 2023-03-28 ENCOUNTER — TELEPHONE (OUTPATIENT)
Dept: CARDIOLOGY | Facility: CLINIC | Age: 53
End: 2023-03-28
Payer: COMMERCIAL

## 2023-03-28 NOTE — TELEPHONE ENCOUNTER
For the HUB to read to pt:       LEFT MESSAGE TO CONFIRM APPT, IF PT CALLS BACK PLEASE PUT CALL THROUGH THANK YOU

## 2023-03-29 ENCOUNTER — OFFICE VISIT (OUTPATIENT)
Dept: CARDIOLOGY | Facility: CLINIC | Age: 53
End: 2023-03-29
Payer: COMMERCIAL

## 2023-03-29 VITALS
BODY MASS INDEX: 30.92 KG/M2 | HEART RATE: 80 BPM | OXYGEN SATURATION: 94 % | DIASTOLIC BLOOD PRESSURE: 90 MMHG | HEIGHT: 66 IN | SYSTOLIC BLOOD PRESSURE: 133 MMHG | WEIGHT: 192.4 LBS

## 2023-03-29 DIAGNOSIS — I10 PRIMARY HYPERTENSION: Primary | ICD-10-CM

## 2023-03-29 DIAGNOSIS — R42 DIZZY: ICD-10-CM

## 2023-03-29 DIAGNOSIS — I95.89 CHRONIC HYPOTENSION: ICD-10-CM

## 2023-03-29 RX ORDER — HYDROCHLOROTHIAZIDE 25 MG/1
25 TABLET ORAL DAILY
COMMUNITY

## 2023-03-29 NOTE — PROGRESS NOTES
Subjective     Yodit Sotelo is a 52 y.o. female who presents to day for Follow-up, Dizziness, and Hypotension (Patient stopped one of her blood pressure medications and is doing better. She has also lost 45 lbs as well .).    CHIEF COMPLIANT  Chief Complaint   Patient presents with   • Follow-up   • Dizziness   • Hypotension     Patient stopped one of her blood pressure medications and is doing better. She has also lost 45 lbs as well .       Active Problems:  Problem List Items Addressed This Visit        Cardiac and Vasculature    HTN (hypertension) - Primary    Relevant Medications    hydroCHLOROthiazide (HYDRODIURIL) 25 MG tablet   Other Visit Diagnoses     Chronic hypotension        Dizzy              HPI  Dizziness  Pertinent negatives include no arthralgias, chest pain, chills, congestion, fatigue, fever, nausea, neck pain, sore throat or weakness.   Hypotension  Pertinent negatives include no arthralgias, chest pain, chills, congestion, fatigue, fever, nausea, neck pain, sore throat or weakness.     Ms. Yodit Sotelo is a 52-year-old female patient being followed up today for chronic arterial hypertension. Today, her blood pressure is 133/90 mmHg with a heart rate of 80 beats per minute. She is on nebivolol and hydrochlorothiazide.  She had to discontinue her losartan due to periods of significant hypotension and to 70 systolic in 50s diastolic.  Since she has stopped this she has not experienced the dizziness and fatigue in which she was experiencing when her blood pressure was dropping.  During this time she did do orthostatic blood pressures which her blood pressure went from 138-142/ down to 112/78 while standing.  This would confirm some degree of orthostasis.    The patient cites she stopped taking losartan 100 mg and switched to Bystolic 5 mg. She states she feels better, but her blood pressure is still elevated. She maintains she has a history of cardiomyopathy and congestive heart failure.  She states when she made the appointment, she was getting dizzy and seeing double. She cites she did not come back after she had her echocardiogram.    She maintains she has diabetes, her A1c was 5.4 percent. She states she has lost weight. She has started taking Ozempic, and is back on atorvastatin. She maintains she takes vitamin B12 injections every month.    She adds she has never had her parathyroid checked.  She did have a mildly elevated calcium.  She had surgery after she did her labwork.    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  Overall she feels like she is done relatively well now.  She denies chest pain, shortness of breath, lower extremity edema, palpitations, fatigue, syncope, PND, orthopnea, or strokelike symptoms.  PRIOR MEDS  Current Outpatient Medications on File Prior to Visit   Medication Sig Dispense Refill   • hydroCHLOROthiazide (HYDRODIURIL) 25 MG tablet Take 1 tablet by mouth Daily.     • nebivolol (BYSTOLIC) 5 MG tablet Take 1 tablet by mouth Daily.     • Tirzepatide (Mounjaro) 7.5 MG/0.5ML solution pen-injector Inject 7.5 mg under the skin into the appropriate area as directed 1 (One) Time Per Week. Prior to [facility] Admission, Patient was on:  Fridays       No current facility-administered medications on file prior to visit.       ALLERGIES  Patient has no known allergies.    HISTORY  Past Medical History:   Diagnosis Date   • ADD (attention deficit disorder)     on Vyvanse   • Anxiety and depression     follows w/ psych NP q6 wks   • CHF (congestive heart failure) ([facility])     d/t broken heart syndrome remotely, no longer follows w/ cardiology, EF reportedly normal   • Colon polyp 2012   • Diverticulitis of colon 2012   • DM2 (diabetes mellitus, type 2) ([facility])     dx 2019, never on insulin, A1C 6.5   • Dyspepsia    • Dyspnea on exertion    • Elevated cholesterol    • Fatigue    • Former smoker     quit 2015   • GERD (gastroesophageal reflux  disease)     controlled w/ daily Prilosec, last EGD  revealing HH   • HTN (hypertension)    • Hyperlipidemia    • Migraines     on Topamax for prophylaxis w/ prn Sumatriptan   • Morbid obesity (HCC)    • Osteoarthritis     daily Mobic, denies steroids   • Sleep apnea     CPAP semi-compliant       Social History     Socioeconomic History   • Marital status:    Tobacco Use   • Smoking status: Former     Packs/day: 1.00     Years: 15.00     Pack years: 15.00     Types: Cigarettes     Quit date: 2015     Years since quittin.2   • Smokeless tobacco: Never   Vaping Use   • Vaping Use: Former   Substance and Sexual Activity   • Alcohol use: Not Currently   • Drug use: Never   • Sexual activity: Not Currently     Partners: Male     Birth control/protection: Hysterectomy       Family History   Problem Relation Age of Onset   • Obesity Mother    • Diabetes Mother    • Hypertension Mother    • Sleep apnea Mother    • Obesity Father    • Skin cancer Father    • Diabetes Father    • Hypertension Father    • Stroke Father    • COPD Father    • Hearing loss Father    • Hyperlipidemia Father    • Obesity Sister    • Hypertension Sister    • Hypertension Brother    • Heart attack Paternal Grandfather        Review of Systems   Constitutional: Negative for chills, fatigue and fever.   HENT: Negative for congestion, rhinorrhea and sore throat.    Respiratory: Negative for chest tightness and shortness of breath.    Cardiovascular: Negative for chest pain, palpitations and leg swelling.   Gastrointestinal: Positive for constipation. Negative for diarrhea and nausea.   Musculoskeletal: Negative for arthralgias, back pain and neck pain.   Allergic/Immunologic: Negative for environmental allergies and food allergies.   Neurological: Positive for dizziness and light-headedness. Negative for syncope and weakness.   Hematological: Does not bruise/bleed easily.   Psychiatric/Behavioral: Negative for sleep disturbance.  "      Objective     VITALS: /90 (BP Location: Left arm, Patient Position: Sitting, Cuff Size: Adult)   Pulse 80   Ht 167.6 cm (66\")   Wt 87.3 kg (192 lb 6.4 oz)   SpO2 94%   BMI 31.05 kg/m²     LABS:   Lab Results (most recent)     None      Lab work dated 03/21/2023 was reviewed today. LDL was 65 mg/dL. HDL was 44 mg/dL. Triglycerides were 122 mg/dL. CMP normL, glucose was 102 mg/dL, kidney function looks good. Sodium, potassium, chloride, sodium, potassium look good. Calcium is a little high. ALT, AST, and white blood cell count were normal. Blood volume was little above the top end of normal. Vitamin B12 was at the top end of normal. Hemoglobin A1c was 5.3 percent. Thyroid was 1.79 mIU/L. Vitamin D was 31 ng/mL.    Blood pressure is 133/90 mmHg with a heart rate of 80 beats per minute.    IMAGING:   No Images in the past 120 days found..    EXAM:  Physical Exam  Vitals and nursing note reviewed.   Constitutional:       Appearance: She is well-developed.   HENT:      Head: Normocephalic.   Eyes:      Pupils: Pupils are equal, round, and reactive to light.   Neck:      Thyroid: No thyroid mass.      Vascular: No carotid bruit or JVD.      Trachea: Trachea and phonation normal.   Cardiovascular:      Rate and Rhythm: Normal rate and regular rhythm.      Pulses:           Radial pulses are 2+ on the right side and 2+ on the left side.        Posterior tibial pulses are 2+ on the right side and 2+ on the left side.      Heart sounds: Normal heart sounds. No murmur heard.    No friction rub. No gallop.   Pulmonary:      Effort: Pulmonary effort is normal. No respiratory distress.      Breath sounds: Normal breath sounds. No wheezing or rales.   Abdominal:      General: Bowel sounds are normal.      Palpations: Abdomen is soft.   Musculoskeletal:         General: No swelling. Normal range of motion.      Cervical back: Neck supple.   Skin:     General: Skin is warm and dry.      Capillary Refill: Capillary " refill takes less than 2 seconds.      Findings: No rash.   Neurological:      Mental Status: She is alert and oriented to person, place, and time.   Psychiatric:         Speech: Speech normal.         Behavior: Behavior normal.         Thought Content: Thought content normal.         Judgment: Judgment normal.         Procedure   Procedures       Assessment & Plan    Diagnosis Plan   1. Primary hypertension        2. Chronic hypotension        3. Dizzy            No follow-ups on file.    Diagnoses and all orders for this visit:    1. Primary hypertension (Primary)    2. Chronic hypotension    3. Dizzy      Plan  1. The patient's previous echocardiogram results were discussed in full detail with the patient.  2. The patient's current medications were discussed in detail.  At this time we did discuss continue the Bystolic and hydrochlorothiazide.  She will continue to hold the losartan.  We did discuss if consistently running hypotensive that we may need to even decrease the Bystolic further to 2.5 mg daily.  3. The patient was advised to drink plenty of water.  Hopefully this will help offset the orthostasis caused by the medication.  She would be at higher risk of this due to diuretic therapy of hydrochlorothiazide.  4. The patient was advised to contact the office if any cardiac issues arise.  5.  Informed of signs and symptoms of ACS and advised to seek emergent treatment for any new worsening symptoms.  Patient also advised sooner follow-up as needed.  Also advised to follow-up with family doctor as needed  This note is dictated utilizing voice recognition software.  Although this record has been proof read, transcriptional errors may still be present. If questions occur regarding the content of this record please do not hesitate to call our office.  I have reviewed and confirmed the accuracy of the ROS as documented by the MA/[email or web address]/ISAIAH De Dios    Assessment  1. Chronic arterial hypertension  2.  Hyperlipidemia  3. Elevated calcium      Yodit Sotelo  reports that she quit smoking about 8 years ago. Her smoking use included cigarettes. She has a 15.00 pack-year smoking history. She has never used smokeless tobacco.. I have educated her on the risk of diseases from using tobacco products.      MEDS ORDERED DURING VISIT:  No orders of the defined types were placed in this encounter.          This document has been electronically signed by ISAIAH Feliz Jr.  March 30, 2023 08:24 EDT    Transcribed from ambient dictation for ISAIAH Feliz by Rebecca Vidal.  03/29/23   16:52 EDT    Patient or patient representative verbalized consent to the visit recording.  I have personally performed the services described in this document as transcribed by the above individual, and it is both accurate and complete.

## 2024-02-20 ENCOUNTER — TELEPHONE (OUTPATIENT)
Dept: CARDIOLOGY | Facility: CLINIC | Age: 54
End: 2024-02-20
Payer: COMMERCIAL

## 2024-02-20 NOTE — TELEPHONE ENCOUNTER
"Relay     \"LVM FOR PT TO CALL BACK TO R/S APPT AS PROVIDER WILL BE OUT OF THE OFFICE\"                  "

## 2024-04-04 ENCOUNTER — TELEPHONE (OUTPATIENT)
Dept: CARDIOLOGY | Facility: CLINIC | Age: 54
End: 2024-04-04
Payer: COMMERCIAL

## (undated) DEVICE — SUT MNCRYL PLS ANTIB UD 4/0 PS2 18IN

## (undated) DEVICE — APPL COTN TP PLSTC 6IN STRL LF PK/2

## (undated) DEVICE — Device: Brand: DEFENDO AIR/WATER/SUCTION AND BIOPSY VALVE

## (undated) DEVICE — ANTIBACTERIAL UNDYED BRAIDED (POLYGLACTIN 910), SYNTHETIC ABSORBABLE SUTURE: Brand: COATED VICRYL

## (undated) DEVICE — CAPS TRANSMTR ENDOSC PH MONTR BRAVO CALIB/FREE

## (undated) DEVICE — PERMANENT CAUTERY HOOK: Brand: ENDOWRIST

## (undated) DEVICE — CANNULA SEAL

## (undated) DEVICE — ADHS SKIN PREMIERPRO EXOFIN TOPICAL HI/VISC .5ML

## (undated) DEVICE — APPL CHLORAPREP HI/LITE 26ML ORNG

## (undated) DEVICE — TUBING, SUCTION, 1/4" X 20', STRAIGHT: Brand: MEDLINE INDUSTRIES, INC.

## (undated) DEVICE — SINGLE PORT MANIFOLD: Brand: NEPTUNE 2

## (undated) DEVICE — DRAPE,UTILTY,TAPE,15X26, 4EA/PK: Brand: MEDLINE

## (undated) DEVICE — GLV SURG PREMIERPRO MIC LTX PF SZ7.5 BRN

## (undated) DEVICE — Device

## (undated) DEVICE — VESSEL SEALER EXTEND: Brand: ENDOWRIST

## (undated) DEVICE — CAPS TRANSMTR ENDOSC PH MONTR BRAVO

## (undated) DEVICE — HOLDER: Brand: DEROYAL

## (undated) DEVICE — PK LAP GEN 70

## (undated) DEVICE — TBG PENCL TELESCP MEGADYNE SMOKE EVAC 10FT

## (undated) DEVICE — CVR DISP HUG U VAC STEEP TREND

## (undated) DEVICE — SUT SILK 2/0 SH 30IN K833H

## (undated) DEVICE — UNDERGLV SURG BIOGEL INDICAT PF 8 GRN

## (undated) DEVICE — THE BITE BLOCK MAXI, LATEX FREE STRAP IS USED TO PROTECT THE ENDOSCOPE INSERTION TUBE FROM BEING BITTEN BY THE PATIENT.

## (undated) DEVICE — FRCP BX RADJAW4 NDL 2.8 240CM LG OG BX40

## (undated) DEVICE — TIP COVER ACCESSORY

## (undated) DEVICE — SUT VIC 0/0 UR6 27IN DYED J603H

## (undated) DEVICE — ARM DRAPE

## (undated) DEVICE — PATIENT RETURN ELECTRODE, SINGLE-USE, CONTACT QUALITY MONITORING, ADULT, WITH 9FT CORD, FOR PATIENTS WEIGING OVER 33LBS. (15KG): Brand: MEGADYNE

## (undated) DEVICE — MEGA SUTURECUT ND: Brand: ENDOWRIST

## (undated) DEVICE — TIP-UP FENESTRATED GRASPER: Brand: ENDOWRIST

## (undated) DEVICE — 40583 XL ADVANCED TRENDELENBURG POSITIONING KIT: Brand: 40583 XL ADVANCED TRENDELENBURG POSITIONING KIT

## (undated) DEVICE — CADIERE FORCEPS: Brand: ENDOWRIST

## (undated) DEVICE — BLADELESS OBTURATOR: Brand: WECK VISTA

## (undated) DEVICE — ST TBG PNEUMOCLEAR EVAC SMOKE HIFLO

## (undated) DEVICE — TOTAL TRAY, 16FR 10ML SIL FOLEY, URN: Brand: MEDLINE

## (undated) DEVICE — 40595 XL TRENDELENBURG POSITIONING KIT: Brand: 40595 XL TRENDELENBURG POSITIONING KIT

## (undated) DEVICE — MARKR SKIN W/RULR AND LBL